# Patient Record
Sex: FEMALE | Race: WHITE | Employment: OTHER | URBAN - METROPOLITAN AREA
[De-identification: names, ages, dates, MRNs, and addresses within clinical notes are randomized per-mention and may not be internally consistent; named-entity substitution may affect disease eponyms.]

---

## 2017-01-23 PROBLEM — M79.2: Status: ACTIVE | Noted: 2017-01-23

## 2017-01-23 PROBLEM — M54.14 THORACIC RADICULOPATHY: Status: ACTIVE | Noted: 2017-01-23

## 2017-01-23 PROBLEM — M79.18 MYOFASCIAL PAIN: Status: ACTIVE | Noted: 2017-01-23

## 2017-04-20 PROBLEM — Z01.810 PREOP CARDIOVASCULAR EXAM: Status: ACTIVE | Noted: 2017-04-20

## 2017-08-31 PROBLEM — Z01.810 PREOP CARDIOVASCULAR EXAM: Status: RESOLVED | Noted: 2017-04-20 | Resolved: 2017-08-31

## 2018-09-11 PROCEDURE — 82785 ASSAY OF IGE: CPT | Performed by: ALLERGY & IMMUNOLOGY

## 2018-09-11 PROCEDURE — 86003 ALLG SPEC IGE CRUDE XTRC EA: CPT | Performed by: ALLERGY & IMMUNOLOGY

## 2018-11-14 PROBLEM — E78.1 HYPERTRIGLYCERIDEMIA: Status: ACTIVE | Noted: 2018-11-14

## 2019-02-27 PROBLEM — E55.9 VITAMIN D DEFICIENCY: Status: ACTIVE | Noted: 2017-11-14

## 2019-02-27 PROBLEM — R63.5 ABNORMAL WEIGHT GAIN: Status: ACTIVE | Noted: 2017-08-25

## 2019-02-27 PROBLEM — R76.8 AUTOANTIBODY TITER ELEVATED: Status: ACTIVE | Noted: 2017-11-14

## 2019-02-27 PROBLEM — N28.9 RENAL INSUFFICIENCY SYNDROME: Status: ACTIVE | Noted: 2017-11-14

## 2019-03-22 PROBLEM — N18.30 CKD (CHRONIC KIDNEY DISEASE) STAGE 3, GFR 30-59 ML/MIN (HCC): Status: ACTIVE | Noted: 2019-03-22

## 2019-04-22 PROCEDURE — 87272 CRYPTOSPORIDIUM AG IF: CPT | Performed by: PHYSICIAN ASSISTANT

## 2019-04-22 PROCEDURE — 87045 FECES CULTURE AEROBIC BACT: CPT | Performed by: PHYSICIAN ASSISTANT

## 2019-04-22 PROCEDURE — 87329 GIARDIA AG IA: CPT | Performed by: PHYSICIAN ASSISTANT

## 2019-04-22 PROCEDURE — 87177 OVA AND PARASITES SMEARS: CPT | Performed by: PHYSICIAN ASSISTANT

## 2019-04-22 PROCEDURE — 87046 STOOL CULTR AEROBIC BACT EA: CPT | Performed by: PHYSICIAN ASSISTANT

## 2019-04-22 PROCEDURE — 87077 CULTURE AEROBIC IDENTIFY: CPT | Performed by: PHYSICIAN ASSISTANT

## 2019-04-22 PROCEDURE — 87209 SMEAR COMPLEX STAIN: CPT | Performed by: PHYSICIAN ASSISTANT

## 2019-04-22 PROCEDURE — 87493 C DIFF AMPLIFIED PROBE: CPT | Performed by: PHYSICIAN ASSISTANT

## 2019-05-21 PROBLEM — E78.1 HYPERTRIGLYCERIDEMIA: Status: RESOLVED | Noted: 2018-11-14 | Resolved: 2019-05-21

## 2019-05-21 PROBLEM — R63.5 ABNORMAL WEIGHT GAIN: Status: RESOLVED | Noted: 2017-08-25 | Resolved: 2019-05-21

## 2019-05-30 PROBLEM — Z15.89 COMPOUND HETEROZYGOUS MTHFR MUTATION C677T/A1298C: Status: ACTIVE | Noted: 2019-05-30

## 2019-05-30 PROCEDURE — 82232 ASSAY OF BETA-2 PROTEIN: CPT | Performed by: INTERNAL MEDICINE

## 2019-05-30 PROCEDURE — 86334 IMMUNOFIX E-PHORESIS SERUM: CPT | Performed by: INTERNAL MEDICINE

## 2019-05-30 PROCEDURE — 82784 ASSAY IGA/IGD/IGG/IGM EACH: CPT | Performed by: INTERNAL MEDICINE

## 2019-05-30 PROCEDURE — 84165 PROTEIN E-PHORESIS SERUM: CPT | Performed by: INTERNAL MEDICINE

## 2019-05-30 PROCEDURE — 83883 ASSAY NEPHELOMETRY NOT SPEC: CPT | Performed by: INTERNAL MEDICINE

## 2020-08-17 ENCOUNTER — TELEPHONE (OUTPATIENT)
Dept: PHYSICAL THERAPY | Age: 65
End: 2020-08-17

## 2020-08-20 ENCOUNTER — APPOINTMENT (OUTPATIENT)
Dept: SPEECH THERAPY | Age: 65
End: 2020-08-20
Attending: OTOLARYNGOLOGY
Payer: COMMERCIAL

## 2020-08-27 ENCOUNTER — TELEMEDICINE (OUTPATIENT)
Dept: SPEECH THERAPY | Age: 65
End: 2020-08-27
Attending: OTOLARYNGOLOGY
Payer: COMMERCIAL

## 2020-08-27 PROCEDURE — 92524 BEHAVRAL QUALIT ANALYS VOICE: CPT

## 2020-08-27 PROCEDURE — 92507 TX SP LANG VOICE COMM INDIV: CPT

## 2020-08-27 NOTE — PROGRESS NOTES
VOCAL CORD DYSFUNCTION EVALUATION:   Referring Physician: Dr. Eduardo Troy  Diagnosis: Vocal Cord Dysfunction J38.3     Date of Service: 8/27/2020     PATIENT SUMMARY   Hamzah Szymanski is a 62year old y/o female  who presents to therapy today via telehea Take 1 tablet (25 mg total) by mouth daily. , Disp: 90 tablet, Rfl: 1  Budesonide-Formoterol Fumarate (SYMBICORT) 160-4.5 MCG/ACT Inhalation Aerosol, Inhale 2 puffs into the lungs 2 (two) times daily. , Disp: 1 Inhaler, Rfl: 0  Spacer/Aero-Holding Farida Factor Estradiol (VAGIFEM) 10 MCG Vaginal Tab, Place vaginally. 2 times weekly , Disp: , Rfl:     No current facility-administered medications on file prior to visit. Manuel Duran describes prior level of function as [facility] prior to June.  Pt goals include wanting was also educated on strategies for preventing/remediating a VCD attack, including exhalation followed by round lip breathing or a sniff-blow breathing. Pt required direct verbal instruction in order to complete this sequence.  Pt was provided with a home e [phone].  If you have any questions, please contact me at Dept: [phone]    Sincerely,  Electronically signed by therapist: NED Gonzalez MS, CCC-SLP/L  Licensed Speech-Language Pathologist      Physician's certification

## 2020-09-01 ENCOUNTER — TELEPHONE (OUTPATIENT)
Dept: PHYSICAL THERAPY | Age: 65
End: 2020-09-01

## 2020-09-03 ENCOUNTER — APPOINTMENT (OUTPATIENT)
Dept: SPEECH THERAPY | Age: 65
End: 2020-09-03
Attending: OTOLARYNGOLOGY
Payer: COMMERCIAL

## 2020-09-17 ENCOUNTER — TELEMEDICINE (OUTPATIENT)
Dept: SPEECH THERAPY | Age: 65
End: 2020-09-17
Attending: OTOLARYNGOLOGY
Payer: COMMERCIAL

## 2020-09-17 PROCEDURE — 92507 TX SP LANG VOICE COMM INDIV: CPT

## 2020-09-17 NOTE — PROGRESS NOTES
Treatment Number: 2  Diagnosis: vocal cord dysfunction J38.3      Precautions: none  Insurance Type (# Auth): BCBS PPO   Date POC Expires: 11/25/2020      Total Treatment time: 60 min  Charges: [id number]    Subjective: Therapy completed over telehealth.  Nazanin Plan: follow up next week over telehealth. Target breathing strategies.

## 2020-09-24 ENCOUNTER — TELEMEDICINE (OUTPATIENT)
Dept: SPEECH THERAPY | Age: 65
End: 2020-09-24
Attending: OTOLARYNGOLOGY
Payer: COMMERCIAL

## 2020-09-24 PROCEDURE — 92507 TX SP LANG VOICE COMM INDIV: CPT

## 2020-09-24 NOTE — PROGRESS NOTES
Treatment Number: 3  Diagnosis: vocal cord dysfunction J38.3      Precautions: none  Insurance Type (# Auth): BCBS PPO   Date POC Expires: 11/25/2020      Total Treatment time: 60 min  Charges: [id number]    Subjective: Therapy completed over telehealth.  Nazanin to take deep breaths. She continues to note occasional gasps for air and we went through how to use strategies to get through that. Encouraged her to keep working on increasing the amount of time she is using the diaphragmatic breathing.      Plan: follow u

## 2020-10-15 ENCOUNTER — APPOINTMENT (OUTPATIENT)
Dept: SPEECH THERAPY | Age: 65
End: 2020-10-15
Attending: OTOLARYNGOLOGY
Payer: COMMERCIAL

## 2020-11-06 ENCOUNTER — HOSPITAL ENCOUNTER (OUTPATIENT)
Dept: CV DIAGNOSTICS | Age: 65
Discharge: HOME | End: 2020-11-06
Attending: INTERNAL MEDICINE
Payer: COMMERCIAL

## 2020-11-06 DIAGNOSIS — R06.02 SOB (SHORTNESS OF BREATH): ICD-10-CM

## 2020-11-06 PROCEDURE — 93306 TTE W/DOPPLER COMPLETE: CPT | Performed by: INTERNAL MEDICINE

## 2021-05-30 ENCOUNTER — LAB ENCOUNTER (OUTPATIENT)
Dept: LAB | Facility: HOSPITAL | Age: 66
End: 2021-05-30
Attending: INTERNAL MEDICINE
Payer: COMMERCIAL

## 2021-05-30 DIAGNOSIS — Z01.818 PRE-OP TESTING: ICD-10-CM

## 2021-06-02 ENCOUNTER — HOSPITAL ENCOUNTER (OUTPATIENT)
Facility: HOSPITAL | Age: 66
Setting detail: HOSPITAL OUTPATIENT SURGERY
Discharge: HOME | End: 2021-06-02
Attending: INTERNAL MEDICINE | Admitting: INTERNAL MEDICINE
Payer: COMMERCIAL

## 2021-06-02 VITALS
OXYGEN SATURATION: 100 % | HEART RATE: 72 BPM | RESPIRATION RATE: 14 BRPM | SYSTOLIC BLOOD PRESSURE: 127 MMHG | DIASTOLIC BLOOD PRESSURE: 81 MMHG

## 2021-06-02 DIAGNOSIS — Z01.818 PRE-OP TESTING: Primary | ICD-10-CM

## 2021-06-02 DIAGNOSIS — R13.19 ESOPHAGEAL DYSPHAGIA: ICD-10-CM

## 2021-06-02 PROCEDURE — 4A0B7BZ MEASUREMENT OF GASTROINTESTINAL PRESSURE, VIA NATURAL OR ARTIFICIAL OPENING: ICD-10-PCS | Performed by: INTERNAL MEDICINE

## 2021-06-02 RX ORDER — LIDOCAINE HYDROCHLORIDE 20 MG/ML
SOLUTION OROPHARYNGEAL
Status: DISCONTINUED | OUTPATIENT
Start: 2021-06-02 | End: 2021-06-02

## 2021-06-30 PROBLEM — M79.2: Status: RESOLVED | Noted: 2017-01-23 | Resolved: 2021-06-30

## 2021-08-02 NOTE — PROCEDURES
ESOPHAGEAL MANOMETRY REPORT    Patient Name:  Marely Godwin  Medical Record #: [id number]  YOB: 1955  Date of Procedure: 6/2/2021    Referring physician: Tanis Riedel, MD    Surgeon:  Janine Olguin MD    Indication: Dysphagia    Findings

## 2021-09-03 PROBLEM — E72.10: Status: ACTIVE | Noted: 2019-05-30

## 2021-09-03 PROBLEM — F32.A DEPRESSIVE DISORDER: Status: ACTIVE | Noted: 2019-12-17

## 2021-09-03 PROBLEM — G43.019 COMMON MIGRAINE WITH INTRACTABLE MIGRAINE: Status: ACTIVE | Noted: 2018-12-05

## 2021-09-03 PROBLEM — Z87.442 HISTORY OF RENAL CALCULI: Status: ACTIVE | Noted: 2019-12-17

## 2021-09-03 PROBLEM — G57.10 MERALGIA PARESTHETICA: Status: ACTIVE | Noted: 2021-04-27

## 2021-09-03 PROBLEM — D18.03 HEMANGIOMA OF LIVER: Status: ACTIVE | Noted: 2019-12-17

## 2021-09-03 PROBLEM — N28.9 RENAL INSUFFICIENCY SYNDROME: Status: ACTIVE | Noted: 2017-11-14

## 2021-09-03 PROBLEM — G43.109 MIGRAINE HEADACHE WITH AURA: Status: ACTIVE | Noted: 2021-09-03

## 2022-01-28 PROBLEM — B34.9 VIRAL SYNDROME: Status: ACTIVE | Noted: 2022-01-28

## 2022-02-01 PROBLEM — R59.0 LYMPHADENOPATHY OF HEAD AND NECK REGION: Status: ACTIVE | Noted: 2022-02-01

## 2022-05-04 ENCOUNTER — PATIENT MESSAGE (OUTPATIENT)
Dept: FAMILY MEDICINE CLINIC | Facility: CLINIC | Age: 67
End: 2022-05-04

## 2022-05-05 NOTE — TELEPHONE ENCOUNTER
From: David Martinez  To: Tobias Harris DO  Sent: 5/4/2022 4:55 PM CDT  Subject: Appointment change    Can the appointment department please check to see if there is an early morning appointment available on 5/11? I need to leave your office no later than 12:30 that day and need to move my 1:45 appointment w/Dr. Joan Carrion.     Thank you,  Nader Hong  [phone]

## 2022-05-11 ENCOUNTER — PATIENT MESSAGE (OUTPATIENT)
Dept: FAMILY MEDICINE CLINIC | Facility: CLINIC | Age: 67
End: 2022-05-11

## 2022-05-12 NOTE — TELEPHONE ENCOUNTER
From: Katina Greenwood  To: Maki Bowser,   Sent: 5/11/2022 3:58 PM CDT  Subject: 5/12 appointment    On 4/19 I began w/terrible pelvic and lower back pain. Urologist ruled out kidney stones (I do have small ones in left kidney). Back surgeon ruled out any back issues w/xray. GNYE/Urologist did external/internal ultrasound and found the left ovary intact, no masses. She called me yesterday and wants to see me Friday the 13th and suspects: Interstitial cystitis. Immediate care put me on a prednisone pack on 4/20 and again on 5/8 with no relief. Also on the 13th I am having a CT w/contrast @ [facility] along with kidney hydration protocol due to Stage III Kidney Disease. Do you think that I should keep my 3 PM appointment on the 12th knowing that all of this back/pelvic pain is going on for 3 weeks now and the pain level is at about a \"7\"? Thanks for you help!!!     Narda Lopez

## 2022-08-16 ENCOUNTER — OFFICE VISIT (OUTPATIENT)
Dept: FAMILY MEDICINE CLINIC | Facility: CLINIC | Age: 67
End: 2022-08-16
Payer: MEDICARE

## 2022-08-16 VITALS
WEIGHT: 201 LBS | TEMPERATURE: 98 F | BODY MASS INDEX: 36.99 KG/M2 | DIASTOLIC BLOOD PRESSURE: 80 MMHG | SYSTOLIC BLOOD PRESSURE: 136 MMHG | HEIGHT: 62 IN

## 2022-08-16 DIAGNOSIS — F41.9 ANXIETY: ICD-10-CM

## 2022-08-16 DIAGNOSIS — M79.7 FIBROMYALGIA: ICD-10-CM

## 2022-08-16 DIAGNOSIS — M54.50 BILATERAL LOW BACK PAIN WITHOUT SCIATICA, UNSPECIFIED CHRONICITY: ICD-10-CM

## 2022-08-16 DIAGNOSIS — G44.219 FREQUENT EPISODIC TENSION-TYPE HEADACHE: Primary | ICD-10-CM

## 2022-08-16 DIAGNOSIS — M54.2 NECK PAIN: ICD-10-CM

## 2022-08-16 PROCEDURE — 98929 OSTEOPATH MANJ 9-10 REGIONS: CPT | Performed by: FAMILY MEDICINE

## 2022-08-16 PROCEDURE — 99214 OFFICE O/P EST MOD 30 MIN: CPT | Performed by: FAMILY MEDICINE

## 2023-03-14 ENCOUNTER — TELEPHONE (OUTPATIENT)
Dept: NEUROLOGY | Facility: CLINIC | Age: 68
End: 2023-03-14

## 2023-03-14 ENCOUNTER — APPOINTMENT (OUTPATIENT)
Dept: MRI IMAGING | Age: 68
End: 2023-03-14
Attending: EMERGENCY MEDICINE
Payer: MEDICARE

## 2023-03-14 ENCOUNTER — APPOINTMENT (OUTPATIENT)
Dept: GENERAL RADIOLOGY | Age: 68
End: 2023-03-14
Attending: EMERGENCY MEDICINE
Payer: MEDICARE

## 2023-03-14 ENCOUNTER — HOSPITAL ENCOUNTER (EMERGENCY)
Age: 68
Discharge: HOME | End: 2023-03-14
Attending: EMERGENCY MEDICINE
Payer: MEDICARE

## 2023-03-14 VITALS
OXYGEN SATURATION: 96 % | WEIGHT: 198 LBS | RESPIRATION RATE: 18 BRPM | BODY MASS INDEX: 37.38 KG/M2 | DIASTOLIC BLOOD PRESSURE: 68 MMHG | HEART RATE: 78 BPM | SYSTOLIC BLOOD PRESSURE: 122 MMHG | HEIGHT: 61 IN

## 2023-03-14 DIAGNOSIS — R42 DIZZINESS: Primary | ICD-10-CM

## 2023-03-14 DIAGNOSIS — R68.84 JAW PAIN: ICD-10-CM

## 2023-03-14 DIAGNOSIS — I74.9 TIA DUE TO EMBOLISM (HCC): ICD-10-CM

## 2023-03-14 DIAGNOSIS — G45.9 TIA DUE TO EMBOLISM (HCC): ICD-10-CM

## 2023-03-14 DIAGNOSIS — G45.9 BRAIN TIA: ICD-10-CM

## 2023-03-14 LAB
ALBUMIN SERPL-MCNC: 4 G/DL (ref 3.4–5)
ALBUMIN/GLOB SERPL: 1.1 {RATIO} (ref 1–2)
ALP LIVER SERPL-CCNC: 85 U/L
ALT SERPL-CCNC: 21 U/L
ANION GAP SERPL CALC-SCNC: 6 MMOL/L (ref 0–18)
AST SERPL-CCNC: 15 U/L (ref 15–37)
ATRIAL RATE: 87 BPM
BASOPHILS # BLD AUTO: 0.18 X10(3) UL (ref 0–0.2)
BASOPHILS NFR BLD AUTO: 2.2 %
BILIRUB SERPL-MCNC: 0.5 MG/DL (ref 0.1–2)
BUN BLD-MCNC: 13 MG/DL (ref 7–18)
CALCIUM BLD-MCNC: 9.2 MG/DL (ref 8.5–10.1)
CHLORIDE SERPL-SCNC: 107 MMOL/L (ref 98–112)
CO2 SERPL-SCNC: 25 MMOL/L (ref 21–32)
CREAT BLD-MCNC: 1.25 MG/DL
D DIMER PPP FEU-MCNC: 0.28 UG/ML FEU (ref ?–0.66)
EOSINOPHIL # BLD AUTO: 0.3 X10(3) UL (ref 0–0.7)
EOSINOPHIL NFR BLD AUTO: 3.6 %
ERYTHROCYTE [DISTWIDTH] IN BLOOD BY AUTOMATED COUNT: 15.2 %
GFR SERPLBLD BASED ON 1.73 SQ M-ARVRAT: 48 ML/MIN/1.73M2 (ref 60–?)
GLOBULIN PLAS-MCNC: 3.5 G/DL (ref 2.8–4.4)
GLUCOSE BLD-MCNC: 118 MG/DL (ref 70–99)
GLUCOSE BLD-MCNC: 132 MG/DL (ref 70–99)
HCT VFR BLD AUTO: 39.3 %
HGB BLD-MCNC: 12.6 G/DL
IMM GRANULOCYTES # BLD AUTO: 0.05 X10(3) UL (ref 0–1)
IMM GRANULOCYTES NFR BLD: 0.6 %
LYMPHOCYTES # BLD AUTO: 1.9 X10(3) UL (ref 1–4)
LYMPHOCYTES NFR BLD AUTO: 23 %
MCH RBC QN AUTO: 25 PG (ref 26–34)
MCHC RBC AUTO-ENTMCNC: 32.1 G/DL (ref 31–37)
MCV RBC AUTO: 78 FL
MONOCYTES # BLD AUTO: 0.64 X10(3) UL (ref 0.1–1)
MONOCYTES NFR BLD AUTO: 7.7 %
NEUTROPHILS # BLD AUTO: 5.2 X10 (3) UL (ref 1.5–7.7)
NEUTROPHILS # BLD AUTO: 5.2 X10(3) UL (ref 1.5–7.7)
NEUTROPHILS NFR BLD AUTO: 62.9 %
NT-PROBNP SERPL-MCNC: 21 PG/ML (ref ?–125)
OSMOLALITY SERPL CALC.SUM OF ELEC: 288 MOSM/KG (ref 275–295)
P AXIS: 10 DEGREES
P-R INTERVAL: 138 MS
PLATELET # BLD AUTO: 483 10(3)UL (ref 150–450)
POTASSIUM SERPL-SCNC: 4.3 MMOL/L (ref 3.5–5.1)
PROT SERPL-MCNC: 7.5 G/DL (ref 6.4–8.2)
Q-T INTERVAL: 430 MS
QRS DURATION: 68 MS
QTC CALCULATION (BEZET): 517 MS
R AXIS: 40 DEGREES
RBC # BLD AUTO: 5.04 X10(6)UL
SODIUM SERPL-SCNC: 138 MMOL/L (ref 136–145)
T AXIS: 15 DEGREES
TROPONIN I HIGH SENSITIVITY: 8 NG/L
TROPONIN I HIGH SENSITIVITY: 9 NG/L
VENTRICULAR RATE: 87 BPM
WBC # BLD AUTO: 8.3 X10(3) UL (ref 4–11)

## 2023-03-14 PROCEDURE — 70551 MRI BRAIN STEM W/O DYE: CPT | Performed by: EMERGENCY MEDICINE

## 2023-03-14 PROCEDURE — 85379 FIBRIN DEGRADATION QUANT: CPT | Performed by: EMERGENCY MEDICINE

## 2023-03-14 PROCEDURE — 84484 ASSAY OF TROPONIN QUANT: CPT | Performed by: EMERGENCY MEDICINE

## 2023-03-14 PROCEDURE — 85025 COMPLETE CBC W/AUTO DIFF WBC: CPT | Performed by: EMERGENCY MEDICINE

## 2023-03-14 PROCEDURE — 83880 ASSAY OF NATRIURETIC PEPTIDE: CPT | Performed by: EMERGENCY MEDICINE

## 2023-03-14 PROCEDURE — 80053 COMPREHEN METABOLIC PANEL: CPT | Performed by: EMERGENCY MEDICINE

## 2023-03-14 PROCEDURE — 82962 GLUCOSE BLOOD TEST: CPT

## 2023-03-14 PROCEDURE — 99285 EMERGENCY DEPT VISIT HI MDM: CPT

## 2023-03-14 PROCEDURE — 36415 COLL VENOUS BLD VENIPUNCTURE: CPT

## 2023-03-14 PROCEDURE — 71045 X-RAY EXAM CHEST 1 VIEW: CPT | Performed by: EMERGENCY MEDICINE

## 2023-03-14 PROCEDURE — 93010 ELECTROCARDIOGRAM REPORT: CPT

## 2023-03-14 PROCEDURE — 70544 MR ANGIOGRAPHY HEAD W/O DYE: CPT | Performed by: EMERGENCY MEDICINE

## 2023-03-14 PROCEDURE — 93005 ELECTROCARDIOGRAM TRACING: CPT

## 2023-03-14 RX ORDER — ASPIRIN 81 MG/1
324 TABLET, CHEWABLE ORAL ONCE
Status: COMPLETED | OUTPATIENT
Start: 2023-03-14 | End: 2023-03-14

## 2023-03-14 RX ORDER — MECLIZINE HYDROCHLORIDE 25 MG/1
25 TABLET ORAL ONCE
Status: COMPLETED | OUTPATIENT
Start: 2023-03-14 | End: 2023-03-14

## 2023-03-14 RX ORDER — MECLIZINE HCL 25MG 25 MG/1
25 TABLET, CHEWABLE ORAL 3 TIMES DAILY PRN
Qty: 90 TABLET | Refills: 0 | Status: SHIPPED | OUTPATIENT
Start: 2023-03-14 | End: 2023-03-14

## 2023-03-14 RX ORDER — MECLIZINE HCL 25MG 25 MG/1
25 TABLET, CHEWABLE ORAL 3 TIMES DAILY PRN
Qty: 90 TABLET | Refills: 0 | Status: SHIPPED | OUTPATIENT
Start: 2023-03-14 | End: 2023-03-29

## 2023-03-14 NOTE — DISCHARGE INSTRUCTIONS
Take  4 baby aspirin daily. Follow-up with neurology, TIA clinic, cardiology return if any severe chest pain, shortness of breath, dizziness, numbness, weakness, trouble speaking. You were seen in the emergency room in a limited time. There is a possibility that although we do not see any acute process at this present time that things can change with time. Is therefore imperative that you follow-up with primary care physician for close follow-up. If there is any significant progression of your pain  or other symptoms you to return immediately to the emergency room.

## 2023-03-14 NOTE — ED INITIAL ASSESSMENT (HPI)
Pt woke up at 0430 with dizziness and right sided jaw pain. Pt called friend who stated her speech was slurred.

## 2023-03-14 NOTE — TELEPHONE ENCOUNTER
TIA CLINIC SCREENING    1. Date of ED visit/TIA diagnosis:  3/14/2023   Time of discharge from ED:  1416    2. Is patient currently admitted? No   If YES - TIA Clinic Appointment not required. 3. Does patient already see an [facility] neurologist?  No  Name:  But patient has [facility] Neurologist Trini Acharya)   (if YES - TIA Clinic Appointment not required. Route message on to patient's neurologist)    4. Patient's current anti-platelet therapy:  917PA ASA    5. Patient's current statin therapy:  Rosuvastatin    6. Has 2D Echo with bubble test been done? No  Date:      7. Is TIA Clinic Appointment indicated? Yes     If YES - route encounter to [address] [place] [address] to schedule patient for appointment NO LATER THAN 48 HOURS AFTER ED DISCHARGE. If UNSURE - route encounter to clinic provider for recommendation.      If NO - indicate reason and close encounter:  N/A

## 2023-03-16 ENCOUNTER — TELEPHONE (OUTPATIENT)
Dept: NEUROLOGY | Facility: CLINIC | Age: 68
End: 2023-03-16

## 2023-03-16 ENCOUNTER — OFFICE VISIT (OUTPATIENT)
Dept: NEUROLOGY | Facility: CLINIC | Age: 68
End: 2023-03-16
Payer: MEDICARE

## 2023-03-16 VITALS
BODY MASS INDEX: 38 KG/M2 | DIASTOLIC BLOOD PRESSURE: 70 MMHG | SYSTOLIC BLOOD PRESSURE: 118 MMHG | RESPIRATION RATE: 16 BRPM | HEART RATE: 78 BPM | WEIGHT: 204 LBS

## 2023-03-16 DIAGNOSIS — R93.0 ABNORMAL MRA, HEAD: ICD-10-CM

## 2023-03-16 DIAGNOSIS — R20.2 PARESTHESIA OF LEFT UPPER AND LOWER EXTREMITY: ICD-10-CM

## 2023-03-16 DIAGNOSIS — R47.89 EPISODE OF CHANGE IN SPEECH: Primary | ICD-10-CM

## 2023-03-16 PROCEDURE — 99204 OFFICE O/P NEW MOD 45 MIN: CPT | Performed by: OTHER

## 2023-03-16 RX ORDER — BUTALBITAL, ACETAMINOPHEN AND CAFFEINE 50; 325; 40 MG/1; MG/1; MG/1
TABLET ORAL
COMMUNITY
Start: 2023-02-25

## 2023-03-16 RX ORDER — ALBUTEROL SULFATE 2.5 MG/3ML
2.5 SOLUTION RESPIRATORY (INHALATION) EVERY 4 HOURS PRN
COMMUNITY
Start: 2023-01-29

## 2023-03-16 RX ORDER — ALBUTEROL SULFATE 90 UG/1
2 AEROSOL, METERED RESPIRATORY (INHALATION) EVERY 4 HOURS PRN
COMMUNITY
Start: 2023-01-27

## 2023-03-16 RX ORDER — BUDESONIDE 0.5 MG/2ML
INHALANT ORAL
COMMUNITY
Start: 2023-02-03

## 2023-03-16 RX ORDER — DILTIAZEM HYDROCHLORIDE 120 MG/1
240 CAPSULE, EXTENDED RELEASE ORAL DAILY
COMMUNITY
Start: 2023-03-10

## 2023-03-16 RX ORDER — TEMAZEPAM 30 MG/1
30 CAPSULE ORAL NIGHTLY
COMMUNITY
Start: 2023-02-24

## 2023-03-16 RX ORDER — SPIRONOLACTONE 50 MG/1
50 TABLET, FILM COATED ORAL DAILY
Qty: 30 TABLET | Refills: 12 | COMMUNITY
Start: 2023-03-01 | End: 2024-03-25

## 2023-03-16 NOTE — PROGRESS NOTES
Patient was seen in the ED on 03/14/2023. Patient had dizziness, numbness in the LLE and LUE. Patient also had slurred speech at this time. Patient states shooting pain on the jaw which lasted a few seconds. This occurred 4 times. Patient states she is well, denies numbness and tingling. Denies changes in speech. Patient states increased fatigue. Patient states increase in intensity and frequency of migraines before the episode on 03/14/2023. Patient states increase occurred since 01/2023.

## 2023-03-17 ENCOUNTER — TELEPHONE (OUTPATIENT)
Dept: SURGERY | Facility: CLINIC | Age: 68
End: 2023-03-17

## 2023-03-17 NOTE — TELEPHONE ENCOUNTER
GLORIA for MA/Nurse at Dr. Joanne Beck office regarding the hydration protocol. Requested call back with any questions. Left message for patient advising above has been done.

## 2023-03-17 NOTE — TELEPHONE ENCOUNTER
Pt is having Kidney Hydration Protocol on Thursday, March 23. She needs an RN to call Dr Mor Dow at [phone]. He need to call it in to [phone]. Pt's best call back number for questions is [phone].

## 2023-03-20 NOTE — TELEPHONE ENCOUNTER
Pt calling requesting to speak to a nurse regarding kidney hydration protocol paperwork that needs to be sent to central scheduling.

## 2023-03-20 NOTE — PROGRESS NOTES
Adding hydration orders to imaging order. Orders received via fax from [address] [place] [address]. RN placed orders and called Central Scheduling to update on status as now two orders appear in system.      RN spoke with Cheryl and she updated orders

## 2023-03-20 NOTE — TELEPHONE ENCOUNTER
Orders received stated 500 ml of 0.9% normal saline over 1 hour 2 hours prior to CT scan. Uploaded to CT order     Copy sent to scanning.

## 2023-03-20 NOTE — TELEPHONE ENCOUNTER
Informed pt that two messages have been left for Dr. Yuan Dumont office for hydration protocol. Gave pt fax number to have protocol faxed to office.

## 2023-03-22 RX ORDER — SODIUM CHLORIDE 9 MG/ML
500 INJECTION, SOLUTION INTRAVENOUS CONTINUOUS
Status: ACTIVE | OUTPATIENT
Start: 2023-03-29 | End: 2023-03-29

## 2023-03-24 ENCOUNTER — PATIENT MESSAGE (OUTPATIENT)
Dept: NEUROLOGY | Facility: CLINIC | Age: 68
End: 2023-03-24

## 2023-03-24 ENCOUNTER — TELEPHONE (OUTPATIENT)
Dept: NEUROLOGY | Facility: CLINIC | Age: 68
End: 2023-03-24

## 2023-03-24 NOTE — TELEPHONE ENCOUNTER
Lab results reviewed and signed by Dr Marilee Cheema and sent to scanning. See alternate TE 3/24/23.

## 2023-03-24 NOTE — TELEPHONE ENCOUNTER
From: Mary Malhotra  To: Dann Finn DO  Sent: 3/24/2023 8:58 AM CDT  Subject: 3/23/23 [facility] results    Please see attached, and thank you. Notice the GFR results dropped.  :(

## 2023-03-27 ENCOUNTER — OFFICE VISIT (OUTPATIENT)
Dept: FAMILY MEDICINE CLINIC | Facility: CLINIC | Age: 68
End: 2023-03-27
Payer: MEDICARE

## 2023-03-27 VITALS
HEART RATE: 90 BPM | WEIGHT: 205 LBS | BODY MASS INDEX: 38 KG/M2 | SYSTOLIC BLOOD PRESSURE: 150 MMHG | TEMPERATURE: 97 F | OXYGEN SATURATION: 98 % | DIASTOLIC BLOOD PRESSURE: 80 MMHG

## 2023-03-27 DIAGNOSIS — M99.01 CERVICAL SEGMENT DYSFUNCTION: ICD-10-CM

## 2023-03-27 DIAGNOSIS — F43.29 STRESS AND ADJUSTMENT REACTION: ICD-10-CM

## 2023-03-27 DIAGNOSIS — M54.2 NECK PAIN: ICD-10-CM

## 2023-03-27 DIAGNOSIS — M54.50 BILATERAL LOW BACK PAIN WITHOUT SCIATICA, UNSPECIFIED CHRONICITY: Primary | ICD-10-CM

## 2023-03-27 DIAGNOSIS — M99.05 SOMATIC DYSFUNCTION OF PELVIS REGION: ICD-10-CM

## 2023-03-27 DIAGNOSIS — G44.219 FREQUENT EPISODIC TENSION-TYPE HEADACHE: ICD-10-CM

## 2023-03-27 DIAGNOSIS — M99.00 HEAD REGION SOMATIC DYSFUNCTION: ICD-10-CM

## 2023-03-27 DIAGNOSIS — M99.08 RIB CAGE REGION SOMATIC DYSFUNCTION: ICD-10-CM

## 2023-03-29 ENCOUNTER — HOSPITAL ENCOUNTER (OUTPATIENT)
Dept: CT IMAGING | Facility: HOSPITAL | Age: 68
Discharge: HOME | End: 2023-03-29
Attending: Other
Payer: MEDICARE

## 2023-03-29 ENCOUNTER — NURSE ONLY (OUTPATIENT)
Dept: LAB | Facility: HOSPITAL | Age: 68
End: 2023-03-29
Attending: Other
Payer: MEDICARE

## 2023-03-29 VITALS
OXYGEN SATURATION: 99 % | SYSTOLIC BLOOD PRESSURE: 142 MMHG | RESPIRATION RATE: 22 BRPM | HEART RATE: 82 BPM | DIASTOLIC BLOOD PRESSURE: 70 MMHG

## 2023-03-29 DIAGNOSIS — R47.89 EPISODE OF CHANGE IN SPEECH: ICD-10-CM

## 2023-03-29 DIAGNOSIS — R93.0 ABNORMAL MRA, HEAD: ICD-10-CM

## 2023-03-29 DIAGNOSIS — R20.2 PARESTHESIA OF LEFT UPPER AND LOWER EXTREMITY: ICD-10-CM

## 2023-03-29 PROCEDURE — 70496 CT ANGIOGRAPHY HEAD: CPT | Performed by: OTHER

## 2023-03-29 PROCEDURE — 70498 CT ANGIOGRAPHY NECK: CPT | Performed by: OTHER

## 2023-03-29 PROCEDURE — 96360 HYDRATION IV INFUSION INIT: CPT

## 2023-03-29 RX ORDER — SODIUM CHLORIDE 9 MG/ML
INJECTION, SOLUTION INTRAVENOUS CONTINUOUS
Status: DISCONTINUED | OUTPATIENT
Start: 2023-03-29 | End: 2023-03-29

## 2023-03-29 RX ADMIN — SODIUM CHLORIDE: 9 INJECTION, SOLUTION INTRAVENOUS at 09:30:00

## 2023-04-01 ENCOUNTER — PATIENT MESSAGE (OUTPATIENT)
Dept: NEUROLOGY | Facility: CLINIC | Age: 68
End: 2023-04-01

## 2023-04-03 NOTE — TELEPHONE ENCOUNTER
From: Corby Rhodes  To: Lucie Daniels DO  Sent: 4/1/2023 7:55 AM CDT  Subject: Scan result question    Morning Dr. Jono Estrada:    Regarding the verbiage from the report: \"extremely small terminal right vertebral artery\" - Is this something to be corrected, watched, or just we aware of? Would this extremely small artery be the cause of my frequent migraines? Is there any solution to this and what future effects should I expect from this?     Thanks so much,   [facility]

## 2023-06-30 ENCOUNTER — TELEPHONE (OUTPATIENT)
Dept: NEUROLOGY | Facility: CLINIC | Age: 68
End: 2023-06-30

## 2023-06-30 NOTE — TELEPHONE ENCOUNTER
Lvm to confirm appt with Ramakrishna in [place] on 7/3/20 please confirm appt and advise date,time,and location

## 2023-07-03 ENCOUNTER — LAB ENCOUNTER (OUTPATIENT)
Dept: LAB | Facility: HOSPITAL | Age: 68
End: 2023-07-03
Attending: Other
Payer: MEDICARE

## 2023-07-03 ENCOUNTER — OFFICE VISIT (OUTPATIENT)
Dept: NEUROLOGY | Facility: CLINIC | Age: 68
End: 2023-07-03
Payer: MEDICARE

## 2023-07-03 VITALS
DIASTOLIC BLOOD PRESSURE: 70 MMHG | RESPIRATION RATE: 16 BRPM | BODY MASS INDEX: 38 KG/M2 | WEIGHT: 202 LBS | SYSTOLIC BLOOD PRESSURE: 122 MMHG | OXYGEN SATURATION: 97 % | HEART RATE: 77 BPM

## 2023-07-03 DIAGNOSIS — G57.13 MERALGIA PARESTHETICA, BILATERAL LOWER LIMBS: ICD-10-CM

## 2023-07-03 DIAGNOSIS — I20.1 PRINZMETAL ANGINA (HCC): ICD-10-CM

## 2023-07-03 DIAGNOSIS — R47.89 EPISODE OF CHANGE IN SPEECH: Primary | ICD-10-CM

## 2023-07-03 DIAGNOSIS — G43.109 MIGRAINE WITH AURA AND WITHOUT STATUS MIGRAINOSUS, NOT INTRACTABLE: ICD-10-CM

## 2023-07-03 DIAGNOSIS — R47.89 WORD FINDING DIFFICULTY: ICD-10-CM

## 2023-07-03 LAB
TSI SER-ACNC: 1.84 MIU/ML (ref 0.36–3.74)
VIT B12 SERPL-MCNC: 487 PG/ML (ref 193–986)

## 2023-07-03 PROCEDURE — 99214 OFFICE O/P EST MOD 30 MIN: CPT | Performed by: OTHER

## 2023-07-03 PROCEDURE — 82607 VITAMIN B-12: CPT

## 2023-07-03 PROCEDURE — 36415 COLL VENOUS BLD VENIPUNCTURE: CPT

## 2023-07-03 PROCEDURE — 84443 ASSAY THYROID STIM HORMONE: CPT

## 2023-07-03 RX ORDER — VERAPAMIL HYDROCHLORIDE 240 MG/1
240 TABLET, FILM COATED, EXTENDED RELEASE ORAL NIGHTLY
COMMUNITY

## 2023-07-03 RX ORDER — BUSPIRONE HYDROCHLORIDE 10 MG/1
10 TABLET ORAL 2 TIMES DAILY
COMMUNITY
Start: 2023-03-01

## 2023-07-03 RX ORDER — LOSARTAN POTASSIUM 25 MG/1
TABLET ORAL
COMMUNITY
Start: 2023-05-01

## 2023-07-03 RX ORDER — DILTIAZEM HYDROCHLORIDE 120 MG/1
CAPSULE, EXTENDED RELEASE ORAL
COMMUNITY
Start: 2023-03-16

## 2023-07-03 RX ORDER — LORAZEPAM 0.5 MG/1
TABLET ORAL
COMMUNITY
Start: 2023-04-06

## 2023-07-15 ENCOUNTER — APPOINTMENT (OUTPATIENT)
Dept: GENERAL RADIOLOGY | Age: 68
End: 2023-07-15
Attending: EMERGENCY MEDICINE
Payer: MEDICARE

## 2023-07-15 ENCOUNTER — HOSPITAL ENCOUNTER (EMERGENCY)
Age: 68
Discharge: HOME | End: 2023-07-15
Attending: EMERGENCY MEDICINE
Payer: MEDICARE

## 2023-07-15 VITALS
HEART RATE: 64 BPM | BODY MASS INDEX: 36 KG/M2 | OXYGEN SATURATION: 99 % | DIASTOLIC BLOOD PRESSURE: 60 MMHG | SYSTOLIC BLOOD PRESSURE: 123 MMHG | RESPIRATION RATE: 16 BRPM | WEIGHT: 196 LBS | TEMPERATURE: 97 F

## 2023-07-15 DIAGNOSIS — D50.9 IRON DEFICIENCY ANEMIA, UNSPECIFIED IRON DEFICIENCY ANEMIA TYPE: Primary | ICD-10-CM

## 2023-07-15 LAB
ALBUMIN SERPL-MCNC: 3.8 G/DL (ref 3.4–5)
ALBUMIN/GLOB SERPL: 1 {RATIO} (ref 1–2)
ALP LIVER SERPL-CCNC: 83 U/L
ALT SERPL-CCNC: 20 U/L
ANION GAP SERPL CALC-SCNC: 3 MMOL/L (ref 0–18)
AST SERPL-CCNC: 14 U/L (ref 15–37)
ATRIAL RATE: 65 BPM
BASOPHILS # BLD AUTO: 0.13 X10(3) UL (ref 0–0.2)
BASOPHILS NFR BLD AUTO: 1.4 %
BILIRUB SERPL-MCNC: 0.3 MG/DL (ref 0.1–2)
BUN BLD-MCNC: 18 MG/DL (ref 7–18)
CALCIUM BLD-MCNC: 9.2 MG/DL (ref 8.5–10.1)
CHLORIDE SERPL-SCNC: 107 MMOL/L (ref 98–112)
CO2 SERPL-SCNC: 26 MMOL/L (ref 21–32)
CREAT BLD-MCNC: 1.39 MG/DL
D DIMER PPP FEU-MCNC: 0.34 UG/ML FEU (ref ?–0.66)
EOSINOPHIL # BLD AUTO: 0.33 X10(3) UL (ref 0–0.7)
EOSINOPHIL NFR BLD AUTO: 3.5 %
ERYTHROCYTE [DISTWIDTH] IN BLOOD BY AUTOMATED COUNT: 15.8 %
GFR SERPLBLD BASED ON 1.73 SQ M-ARVRAT: 42 ML/MIN/1.73M2 (ref 60–?)
GLOBULIN PLAS-MCNC: 3.7 G/DL (ref 2.8–4.4)
GLUCOSE BLD-MCNC: 76 MG/DL (ref 70–99)
HCT VFR BLD AUTO: 38.3 %
HGB BLD-MCNC: 11.8 G/DL
IMM GRANULOCYTES # BLD AUTO: 0.05 X10(3) UL (ref 0–1)
IMM GRANULOCYTES NFR BLD: 0.5 %
LYMPHOCYTES # BLD AUTO: 2.08 X10(3) UL (ref 1–4)
LYMPHOCYTES NFR BLD AUTO: 22.2 %
MCH RBC QN AUTO: 23.9 PG (ref 26–34)
MCHC RBC AUTO-ENTMCNC: 30.8 G/DL (ref 31–37)
MCV RBC AUTO: 77.5 FL
MONOCYTES # BLD AUTO: 0.84 X10(3) UL (ref 0.1–1)
MONOCYTES NFR BLD AUTO: 9 %
NEUTROPHILS # BLD AUTO: 5.92 X10 (3) UL (ref 1.5–7.7)
NEUTROPHILS # BLD AUTO: 5.92 X10(3) UL (ref 1.5–7.7)
NEUTROPHILS NFR BLD AUTO: 63.4 %
NT-PROBNP SERPL-MCNC: 48 PG/ML (ref ?–125)
OSMOLALITY SERPL CALC.SUM OF ELEC: 283 MOSM/KG (ref 275–295)
P AXIS: 7 DEGREES
P-R INTERVAL: 142 MS
PLATELET # BLD AUTO: 411 10(3)UL (ref 150–450)
POTASSIUM SERPL-SCNC: 3.9 MMOL/L (ref 3.5–5.1)
PROT SERPL-MCNC: 7.5 G/DL (ref 6.4–8.2)
Q-T INTERVAL: 440 MS
QRS DURATION: 72 MS
QTC CALCULATION (BEZET): 457 MS
R AXIS: 23 DEGREES
RBC # BLD AUTO: 4.94 X10(6)UL
SODIUM SERPL-SCNC: 136 MMOL/L (ref 136–145)
T AXIS: 7 DEGREES
TROPONIN I HIGH SENSITIVITY: 6 NG/L
VENTRICULAR RATE: 65 BPM
WBC # BLD AUTO: 9.4 X10(3) UL (ref 4–11)

## 2023-07-15 PROCEDURE — 36415 COLL VENOUS BLD VENIPUNCTURE: CPT

## 2023-07-15 PROCEDURE — 84484 ASSAY OF TROPONIN QUANT: CPT | Performed by: EMERGENCY MEDICINE

## 2023-07-15 PROCEDURE — 93010 ELECTROCARDIOGRAM REPORT: CPT

## 2023-07-15 PROCEDURE — 85025 COMPLETE CBC W/AUTO DIFF WBC: CPT | Performed by: EMERGENCY MEDICINE

## 2023-07-15 PROCEDURE — 99284 EMERGENCY DEPT VISIT MOD MDM: CPT

## 2023-07-15 PROCEDURE — 99285 EMERGENCY DEPT VISIT HI MDM: CPT

## 2023-07-15 PROCEDURE — 85379 FIBRIN DEGRADATION QUANT: CPT | Performed by: EMERGENCY MEDICINE

## 2023-07-15 PROCEDURE — 82272 OCCULT BLD FECES 1-3 TESTS: CPT

## 2023-07-15 PROCEDURE — 80053 COMPREHEN METABOLIC PANEL: CPT | Performed by: EMERGENCY MEDICINE

## 2023-07-15 PROCEDURE — 83880 ASSAY OF NATRIURETIC PEPTIDE: CPT | Performed by: EMERGENCY MEDICINE

## 2023-07-15 PROCEDURE — 71045 X-RAY EXAM CHEST 1 VIEW: CPT | Performed by: EMERGENCY MEDICINE

## 2023-07-15 PROCEDURE — 93005 ELECTROCARDIOGRAM TRACING: CPT

## 2023-07-15 RX ORDER — FERROUS SULFATE 325(65) MG
325 TABLET ORAL
Qty: 30 TABLET | Refills: 0 | Status: SHIPPED | OUTPATIENT
Start: 2023-07-15 | End: 2023-08-14

## 2023-07-15 NOTE — ED INITIAL ASSESSMENT (HPI)
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  States she has chest tightness associated with the condition and a lower heart rate at night

## 2023-07-17 ENCOUNTER — TELEPHONE (OUTPATIENT)
Dept: HEMATOLOGY/ONCOLOGY | Facility: HOSPITAL | Age: 68
End: 2023-07-17

## 2023-07-17 ENCOUNTER — OFFICE VISIT (OUTPATIENT)
Dept: HEMATOLOGY/ONCOLOGY | Facility: HOSPITAL | Age: 68
End: 2023-07-17
Attending: INTERNAL MEDICINE
Payer: MEDICARE

## 2023-07-17 VITALS
WEIGHT: 199.19 LBS | BODY MASS INDEX: 37.61 KG/M2 | RESPIRATION RATE: 16 BRPM | HEIGHT: 61.02 IN | SYSTOLIC BLOOD PRESSURE: 147 MMHG | OXYGEN SATURATION: 98 % | HEART RATE: 78 BPM | DIASTOLIC BLOOD PRESSURE: 80 MMHG | TEMPERATURE: 97 F

## 2023-07-17 DIAGNOSIS — D50.9 IRON DEFICIENCY ANEMIA, UNSPECIFIED IRON DEFICIENCY ANEMIA TYPE: Primary | ICD-10-CM

## 2023-07-17 PROBLEM — D50.8 IRON DEFICIENCY ANEMIA SECONDARY TO INADEQUATE DIETARY IRON INTAKE: Status: ACTIVE | Noted: 2023-07-17

## 2023-07-17 PROCEDURE — 99205 OFFICE O/P NEW HI 60 MIN: CPT | Performed by: INTERNAL MEDICINE

## 2023-07-17 NOTE — TELEPHONE ENCOUNTER
[facility] to see if patient would like to see Dr. Love Alcaraz on 7/17/23 at 100 PM, Called 7/17/23.

## 2023-07-17 NOTE — PROGRESS NOTES
Education Record    Learner:  Patient    Disease / Diagnosis: Iron deficiency    Barriers / Limitations:  None   Comments:    Method:  Discussion   Comments:    General Topics:  Plan of care reviewed   Comments:    Outcome:  Shows understanding   Comments:    Here f/u after [place] ER visit. Here for iron deficiency anemia. She is having a procedure Monday 7/24 and then major surgery 8/12 or 8/15. She feels fatigued, dizzy/lightheaded. No abnormal bleeding. Shortness of breath is present but possibly due to decreasing lasix dose.

## 2023-07-18 ENCOUNTER — APPOINTMENT (OUTPATIENT)
Dept: HEMATOLOGY/ONCOLOGY | Facility: HOSPITAL | Age: 68
End: 2023-07-18
Attending: INTERNAL MEDICINE
Payer: MEDICARE

## 2023-07-18 ENCOUNTER — OFFICE VISIT (OUTPATIENT)
Dept: HEMATOLOGY/ONCOLOGY | Age: 68
End: 2023-07-18
Attending: INTERNAL MEDICINE
Payer: MEDICARE

## 2023-07-18 VITALS
RESPIRATION RATE: 20 BRPM | OXYGEN SATURATION: 98 % | SYSTOLIC BLOOD PRESSURE: 118 MMHG | HEART RATE: 68 BPM | TEMPERATURE: 97 F | DIASTOLIC BLOOD PRESSURE: 72 MMHG

## 2023-07-18 DIAGNOSIS — D50.8 IRON DEFICIENCY ANEMIA SECONDARY TO INADEQUATE DIETARY IRON INTAKE: Primary | ICD-10-CM

## 2023-07-18 PROCEDURE — 96374 THER/PROPH/DIAG INJ IV PUSH: CPT

## 2023-07-18 PROCEDURE — 96375 TX/PRO/DX INJ NEW DRUG ADDON: CPT

## 2023-07-18 PROCEDURE — 96365 THER/PROPH/DIAG IV INF INIT: CPT

## 2023-07-18 RX ORDER — METHYLPREDNISOLONE SODIUM SUCCINATE 125 MG/2ML
125 INJECTION, POWDER, LYOPHILIZED, FOR SOLUTION INTRAMUSCULAR; INTRAVENOUS ONCE
Status: COMPLETED | OUTPATIENT
Start: 2023-07-18 | End: 2023-07-18

## 2023-07-18 RX ADMIN — METHYLPREDNISOLONE SODIUM SUCCINATE 125 MG: 125 INJECTION, POWDER, LYOPHILIZED, FOR SOLUTION INTRAMUSCULAR; INTRAVENOUS at 14:54:00

## 2023-07-18 NOTE — PROGRESS NOTES
Education Record    Learner:  Patient    Disease / Diagnosis: here for feraheme    Barriers / Limitations:  None   Comments:    Method:  Brief focused and Discussion   Comments:    General Topics:  Medication, Side effects and symptom management, and Plan of care reviewed   Comments:    Outcome:  Shows understanding   Comments: Towards the end of Feraheme infusion, pt c/o feeling \"like my blood pressure is dropping. \"  Feraheme stopped and BP checked. BP WNL. Pt then c/o chest tightness. Denied chest pain and SOB. IVF started. Dr Jaky Young called to chairside. 125 mg solumedrol given and symptoms resolved with in a few minutes. Ok to restart Feraheme at a slower rate per Dr Jaky Young. Pt tolerated remainder of infusion. Observed for 30 minutes post Feraheme. Per order, feraheme weekly X 2. Pt requesting to have next Feraheme appointment on 8/9 due to her surgery schedules. Irlanda Chahal Dr [facility] RN notified of above. Discharged home in stable condition, no new complaints.

## 2023-07-18 NOTE — PROGRESS NOTES
Patient developed chest tightness toward the end of her Feraheme infusion. She states the symptoms felt similar to her typical Prinzmetal anginal symptoms. Vitals remained stable. Physical exam was unremarkable. Feraheme infusion held and Solu-Medrol 125 mg IV given. Patient felt back to baseline within a few minutes and Feraheme infusion was resumed and completed without further complication.     Min Stevens MD  Hematology/Medical Oncology  [facility]

## 2023-07-19 ENCOUNTER — TELEPHONE (OUTPATIENT)
Dept: HEMATOLOGY/ONCOLOGY | Facility: HOSPITAL | Age: 68
End: 2023-07-19

## 2023-07-19 NOTE — TELEPHONE ENCOUNTER
Patient called post reaction. She is doing better today,  did have some issues with sleeping otherwise finally able to sleep. Was concerned about steroid given and any it causing any kidney problems as she has some kidney abnormalities. ER MD told her it was not a problem.

## 2023-07-26 RX ORDER — METHYLPREDNISOLONE SODIUM SUCCINATE 125 MG/2ML
125 INJECTION, POWDER, LYOPHILIZED, FOR SOLUTION INTRAMUSCULAR; INTRAVENOUS ONCE
Start: 2023-07-26 | End: 2023-07-26

## 2023-08-03 ENCOUNTER — OFFICE VISIT (OUTPATIENT)
Dept: HEMATOLOGY/ONCOLOGY | Age: 68
End: 2023-08-03
Attending: INTERNAL MEDICINE
Payer: MEDICARE

## 2023-08-03 VITALS
TEMPERATURE: 97 F | WEIGHT: 202.81 LBS | BODY MASS INDEX: 38.29 KG/M2 | RESPIRATION RATE: 18 BRPM | HEIGHT: 61.02 IN | OXYGEN SATURATION: 97 % | DIASTOLIC BLOOD PRESSURE: 73 MMHG | SYSTOLIC BLOOD PRESSURE: 131 MMHG | HEART RATE: 73 BPM

## 2023-08-03 DIAGNOSIS — D50.8 IRON DEFICIENCY ANEMIA SECONDARY TO INADEQUATE DIETARY IRON INTAKE: Primary | ICD-10-CM

## 2023-08-03 PROCEDURE — 96374 THER/PROPH/DIAG INJ IV PUSH: CPT

## 2023-08-03 PROCEDURE — 96375 TX/PRO/DX INJ NEW DRUG ADDON: CPT

## 2023-08-03 RX ORDER — METHYLPREDNISOLONE SODIUM SUCCINATE 125 MG/2ML
125 INJECTION, POWDER, LYOPHILIZED, FOR SOLUTION INTRAMUSCULAR; INTRAVENOUS ONCE
Status: COMPLETED | OUTPATIENT
Start: 2023-08-03 | End: 2023-08-03

## 2023-08-03 RX ORDER — METHYLPREDNISOLONE SODIUM SUCCINATE 125 MG/2ML
125 INJECTION, POWDER, LYOPHILIZED, FOR SOLUTION INTRAMUSCULAR; INTRAVENOUS ONCE
Status: CANCELLED
Start: 2023-08-03 | End: 2023-08-03

## 2023-08-03 RX ADMIN — METHYLPREDNISOLONE SODIUM SUCCINATE 125 MG: 125 INJECTION, POWDER, LYOPHILIZED, FOR SOLUTION INTRAMUSCULAR; INTRAVENOUS at 13:14:00

## 2023-08-03 NOTE — PROGRESS NOTES
Education Record    Learner:  Patient    Disease / Diagnosis: here for feraheme    Barriers / Limitations:  None    Method:  Brief focused, printed material and  reinforcement    General Topics:  Plan of care reviewed    Outcome: patient ambulatory. Arrived with friend. Aware to get labs done 1 month after surgery. Shows understanding. Tolerated infusion. Monitored for 30 min post. VSS. Discharged in stable condition.

## 2023-08-09 ENCOUNTER — APPOINTMENT (OUTPATIENT)
Dept: HEMATOLOGY/ONCOLOGY | Age: 68
End: 2023-08-09
Attending: INTERNAL MEDICINE
Payer: MEDICARE

## 2023-09-13 ENCOUNTER — OFFICE VISIT (OUTPATIENT)
Dept: HEMATOLOGY/ONCOLOGY | Age: 68
End: 2023-09-13
Attending: INTERNAL MEDICINE
Payer: MEDICARE

## 2023-09-13 VITALS
OXYGEN SATURATION: 98 % | RESPIRATION RATE: 18 BRPM | HEIGHT: 61.02 IN | SYSTOLIC BLOOD PRESSURE: 116 MMHG | BODY MASS INDEX: 37.95 KG/M2 | HEART RATE: 80 BPM | TEMPERATURE: 97 F | DIASTOLIC BLOOD PRESSURE: 76 MMHG | WEIGHT: 201 LBS

## 2023-09-13 DIAGNOSIS — D50.9 IRON DEFICIENCY ANEMIA, UNSPECIFIED IRON DEFICIENCY ANEMIA TYPE: ICD-10-CM

## 2023-09-13 LAB
BASOPHILS # BLD AUTO: 0.12 X10(3) UL (ref 0–0.2)
BASOPHILS NFR BLD AUTO: 1.5 %
DEPRECATED HBV CORE AB SER IA-ACNC: 95.4 NG/ML
EOSINOPHIL # BLD AUTO: 0.3 X10(3) UL (ref 0–0.7)
EOSINOPHIL NFR BLD AUTO: 3.8 %
ERYTHROCYTE [DISTWIDTH] IN BLOOD BY AUTOMATED COUNT: 21 %
HCT VFR BLD AUTO: 42.9 %
HGB BLD-MCNC: 14.3 G/DL
IMM GRANULOCYTES # BLD AUTO: 0.04 X10(3) UL (ref 0–1)
IMM GRANULOCYTES NFR BLD: 0.5 %
IRON SATN MFR SERPL: 31 %
IRON SERPL-MCNC: 111 UG/DL
LYMPHOCYTES # BLD AUTO: 1.74 X10(3) UL (ref 1–4)
LYMPHOCYTES NFR BLD AUTO: 21.9 %
MCH RBC QN AUTO: 28.5 PG (ref 26–34)
MCHC RBC AUTO-ENTMCNC: 33.3 G/DL (ref 31–37)
MCV RBC AUTO: 85.6 FL
MONOCYTES # BLD AUTO: 0.55 X10(3) UL (ref 0.1–1)
MONOCYTES NFR BLD AUTO: 6.9 %
NEUTROPHILS # BLD AUTO: 5.21 X10 (3) UL (ref 1.5–7.7)
NEUTROPHILS # BLD AUTO: 5.21 X10(3) UL (ref 1.5–7.7)
NEUTROPHILS NFR BLD AUTO: 65.4 %
PLATELET # BLD AUTO: 283 10(3)UL (ref 150–450)
RBC # BLD AUTO: 5.01 X10(6)UL
TIBC SERPL-MCNC: 361 UG/DL (ref 240–450)
TRANSFERRIN SERPL-MCNC: 242 MG/DL (ref 200–360)
WBC # BLD AUTO: 8 X10(3) UL (ref 4–11)

## 2023-09-13 PROCEDURE — 99214 OFFICE O/P EST MOD 30 MIN: CPT | Performed by: INTERNAL MEDICINE

## 2023-10-04 ENCOUNTER — OFFICE VISIT (OUTPATIENT)
Facility: CLINIC | Age: 68
End: 2023-10-04
Payer: MEDICARE

## 2023-10-04 VITALS
BODY MASS INDEX: 38 KG/M2 | HEART RATE: 76 BPM | WEIGHT: 201 LBS | SYSTOLIC BLOOD PRESSURE: 130 MMHG | OXYGEN SATURATION: 98 % | DIASTOLIC BLOOD PRESSURE: 64 MMHG

## 2023-10-04 DIAGNOSIS — E23.7 PITUITARY ABNORMALITY (HCC): ICD-10-CM

## 2023-10-04 DIAGNOSIS — Z13.29 SCREENING FOR HYPOTHYROIDISM: ICD-10-CM

## 2023-10-04 DIAGNOSIS — E24.9 HYPERCORTISOLISM (HCC): Primary | ICD-10-CM

## 2023-10-04 PROCEDURE — 99204 OFFICE O/P NEW MOD 45 MIN: CPT | Performed by: STUDENT IN AN ORGANIZED HEALTH CARE EDUCATION/TRAINING PROGRAM

## 2023-10-04 RX ORDER — DEXAMETHASONE 1 MG
1 TABLET ORAL ONCE
Qty: 1 TABLET | Refills: 0 | Status: SHIPPED | OUTPATIENT
Start: 2023-10-04 | End: 2023-10-04

## 2023-10-04 NOTE — PATIENT INSTRUCTIONS
In order to evaluate you for Cushing's (disease or syndrome), we will have you perform a dexamethasone suppression test. Please take 1mg of dexamethasone at 11PM and then the following morning, please have your blood tests drawn close to 8AM on an empty stomach. We will also check your thyroid function after your surgery to make sure everything there is normal.   Depending on the results, we'll discuss if we need any further investigation.      Return Visit   [  ] Physician in 1 month  [  ] In person or video visit  [  ] In person only    [  ] After visit summary   [X] Fasting/8AM labs  [  ] Central scheduling # for ultrasound/nuclear med/CT/MRI/DXA  [X] Directions to 1st floor lab to collect urine collection jug/salivary cortisol tubes  [  ] Med rep info for:  [  ] Dental clearance form  [  ] Will need authorization for outside records  [  ] Give blood sugar log  [  ] Other:

## 2023-10-05 ENCOUNTER — LAB ENCOUNTER (OUTPATIENT)
Dept: LAB | Age: 68
End: 2023-10-05
Attending: STUDENT IN AN ORGANIZED HEALTH CARE EDUCATION/TRAINING PROGRAM
Payer: MEDICARE

## 2023-10-05 DIAGNOSIS — E24.9 HYPERCORTISOLISM (HCC): ICD-10-CM

## 2023-10-05 DIAGNOSIS — Z13.29 SCREENING FOR HYPOTHYROIDISM: ICD-10-CM

## 2023-10-05 LAB
CORTIS SERPL-MCNC: 1.2 UG/DL
T3 SERPL-MCNC: 113 NG/DL (ref 60–181)
T4 FREE SERPL-MCNC: 0.7 NG/DL (ref 0.8–1.7)
TSI SER-ACNC: 1.08 MIU/ML (ref 0.36–3.74)

## 2023-10-05 PROCEDURE — 84443 ASSAY THYROID STIM HORMONE: CPT

## 2023-10-05 PROCEDURE — 80299 QUANTITATIVE ASSAY DRUG: CPT

## 2023-10-05 PROCEDURE — 36415 COLL VENOUS BLD VENIPUNCTURE: CPT

## 2023-10-05 PROCEDURE — 82024 ASSAY OF ACTH: CPT

## 2023-10-05 PROCEDURE — 82533 TOTAL CORTISOL: CPT

## 2023-10-05 PROCEDURE — 84480 ASSAY TRIIODOTHYRONINE (T3): CPT

## 2023-10-05 PROCEDURE — 84439 ASSAY OF FREE THYROXINE: CPT

## 2023-10-06 LAB — ACTH: 4.2 PG/ML

## 2023-10-13 ENCOUNTER — PATIENT MESSAGE (OUTPATIENT)
Facility: CLINIC | Age: 68
End: 2023-10-13

## 2023-10-13 LAB — DEXAMETHASONE, SERUM: 690 NG/DL

## 2023-11-08 ENCOUNTER — LAB ENCOUNTER (OUTPATIENT)
Dept: LAB | Facility: HOSPITAL | Age: 68
End: 2023-11-08
Attending: STUDENT IN AN ORGANIZED HEALTH CARE EDUCATION/TRAINING PROGRAM
Payer: MEDICARE

## 2023-11-08 ENCOUNTER — OFFICE VISIT (OUTPATIENT)
Facility: CLINIC | Age: 68
End: 2023-11-08
Payer: MEDICARE

## 2023-11-08 VITALS
WEIGHT: 201 LBS | RESPIRATION RATE: 18 BRPM | OXYGEN SATURATION: 97 % | HEART RATE: 97 BPM | BODY MASS INDEX: 37.95 KG/M2 | HEIGHT: 61 IN | DIASTOLIC BLOOD PRESSURE: 80 MMHG | SYSTOLIC BLOOD PRESSURE: 130 MMHG

## 2023-11-08 DIAGNOSIS — N18.32 CHRONIC RENAL IMPAIRMENT, STAGE 3B (HCC): ICD-10-CM

## 2023-11-08 DIAGNOSIS — E03.8 CENTRAL HYPOTHYROIDISM: Primary | ICD-10-CM

## 2023-11-08 LAB
ANION GAP SERPL CALC-SCNC: 4 MMOL/L (ref 0–18)
BUN BLD-MCNC: 16 MG/DL (ref 9–23)
CALCIUM BLD-MCNC: 9.1 MG/DL (ref 8.5–10.1)
CHLORIDE SERPL-SCNC: 111 MMOL/L (ref 98–112)
CO2 SERPL-SCNC: 26 MMOL/L (ref 21–32)
CORTIS SERPL-MCNC: 13.7 UG/DL
CREAT BLD-MCNC: 1.18 MG/DL
EGFRCR SERPLBLD CKD-EPI 2021: 51 ML/MIN/1.73M2 (ref 60–?)
FASTING STATUS PATIENT QL REPORTED: YES
FSH SERPL-ACNC: 45.5 MIU/ML
GLUCOSE BLD-MCNC: 114 MG/DL (ref 70–99)
LH SERPL-ACNC: 14.7 MIU/ML
OSMOLALITY SERPL CALC.SUM OF ELEC: 294 MOSM/KG (ref 275–295)
POTASSIUM SERPL-SCNC: 4.5 MMOL/L (ref 3.5–5.1)
PROLACTIN SERPL-MCNC: 9.6 NG/ML
SODIUM SERPL-SCNC: 141 MMOL/L (ref 136–145)
T3 SERPL-MCNC: 104 NG/DL (ref 60–181)
T4 FREE SERPL-MCNC: 0.6 NG/DL (ref 0.8–1.7)
TSI SER-ACNC: 1.86 MIU/ML (ref 0.36–3.74)

## 2023-11-08 PROCEDURE — 84480 ASSAY TRIIODOTHYRONINE (T3): CPT | Performed by: STUDENT IN AN ORGANIZED HEALTH CARE EDUCATION/TRAINING PROGRAM

## 2023-11-08 PROCEDURE — 84439 ASSAY OF FREE THYROXINE: CPT | Performed by: STUDENT IN AN ORGANIZED HEALTH CARE EDUCATION/TRAINING PROGRAM

## 2023-11-08 PROCEDURE — 84146 ASSAY OF PROLACTIN: CPT | Performed by: STUDENT IN AN ORGANIZED HEALTH CARE EDUCATION/TRAINING PROGRAM

## 2023-11-08 PROCEDURE — 84443 ASSAY THYROID STIM HORMONE: CPT | Performed by: STUDENT IN AN ORGANIZED HEALTH CARE EDUCATION/TRAINING PROGRAM

## 2023-11-08 PROCEDURE — 82533 TOTAL CORTISOL: CPT | Performed by: STUDENT IN AN ORGANIZED HEALTH CARE EDUCATION/TRAINING PROGRAM

## 2023-11-08 PROCEDURE — 84305 ASSAY OF SOMATOMEDIN: CPT | Performed by: STUDENT IN AN ORGANIZED HEALTH CARE EDUCATION/TRAINING PROGRAM

## 2023-11-08 PROCEDURE — 83002 ASSAY OF GONADOTROPIN (LH): CPT | Performed by: STUDENT IN AN ORGANIZED HEALTH CARE EDUCATION/TRAINING PROGRAM

## 2023-11-08 PROCEDURE — 36415 COLL VENOUS BLD VENIPUNCTURE: CPT | Performed by: STUDENT IN AN ORGANIZED HEALTH CARE EDUCATION/TRAINING PROGRAM

## 2023-11-08 PROCEDURE — 84481 FREE ASSAY (FT-3): CPT | Performed by: STUDENT IN AN ORGANIZED HEALTH CARE EDUCATION/TRAINING PROGRAM

## 2023-11-08 PROCEDURE — 83001 ASSAY OF GONADOTROPIN (FSH): CPT | Performed by: STUDENT IN AN ORGANIZED HEALTH CARE EDUCATION/TRAINING PROGRAM

## 2023-11-08 PROCEDURE — 99215 OFFICE O/P EST HI 40 MIN: CPT | Performed by: STUDENT IN AN ORGANIZED HEALTH CARE EDUCATION/TRAINING PROGRAM

## 2023-11-08 PROCEDURE — 80048 BASIC METABOLIC PNL TOTAL CA: CPT

## 2023-11-08 PROCEDURE — 83520 IMMUNOASSAY QUANT NOS NONAB: CPT | Performed by: STUDENT IN AN ORGANIZED HEALTH CARE EDUCATION/TRAINING PROGRAM

## 2023-11-09 ENCOUNTER — PATIENT MESSAGE (OUTPATIENT)
Facility: CLINIC | Age: 68
End: 2023-11-09

## 2023-11-09 ENCOUNTER — LAB ENCOUNTER (OUTPATIENT)
Dept: LAB | Age: 68
End: 2023-11-09
Attending: STUDENT IN AN ORGANIZED HEALTH CARE EDUCATION/TRAINING PROGRAM
Payer: MEDICARE

## 2023-11-09 DIAGNOSIS — E03.8 CENTRAL HYPOTHYROIDISM: ICD-10-CM

## 2023-11-09 DIAGNOSIS — E03.8 CENTRAL HYPOTHYROIDISM: Primary | ICD-10-CM

## 2023-11-09 LAB — T3FREE SERPL-MCNC: 2.31 PG/ML (ref 2.4–4.2)

## 2023-11-09 PROCEDURE — 84305 ASSAY OF SOMATOMEDIN: CPT

## 2023-11-09 PROCEDURE — 36415 COLL VENOUS BLD VENIPUNCTURE: CPT

## 2023-11-09 PROCEDURE — 84439 ASSAY OF FREE THYROXINE: CPT

## 2023-11-09 RX ORDER — LEVOTHYROXINE SODIUM 0.1 MG/1
100 TABLET ORAL
Qty: 90 TABLET | Refills: 0 | OUTPATIENT
Start: 2023-11-09

## 2023-11-09 RX ORDER — LEVOTHYROXINE SODIUM 0.1 MG/1
100 TABLET ORAL
Qty: 30 TABLET | Refills: 0 | Status: SHIPPED | OUTPATIENT
Start: 2023-11-09

## 2023-11-09 NOTE — TELEPHONE ENCOUNTER
RN phoned patient to discuss [facility] questions:   Note from Dr. Mary Rob: I'll answer her questions as best I can - I don't have a slot for a virtual appointment with her today. 1) Her full pituitary workup is completely normal aside from the thyroid, so I do not expect that she has a pituitary mass as ordinarily you lose the thyroid function last when you have a pituitary adenoma. Since her prolactin, FSH/LH, cortisol were normal this argues against a pituitary tumor large enough to cause visual changes (and those vision changes would be very specific - LOSS of peripheral visual fields. Blurred vision and changing prescriptions are not a feature of pituitary tumors, which are usually more related to the eyes themselves). 2) I need to see the pending labs (which are already being processed in the lab) before I am able to justify another MRI since her existing lab results do not match with a pituitary problem (meaning, if it ultimately turns out that her low thyroid hormones are an error of the lab and not a true deficiency, she does not need either levothyroxine or pituitary imaging). 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 They are benign (noncancerous) and usually do not require further investigation or therapy. This cyst would not have anything to do with her pituitary gland or thyroid. 4) Since we know her cortisol is normal, we can start levothyroxine \"empirically\" (meaning, before proving 100% that it is necessary) since it has low risk for causing harm even if she doesn't ultimately need it. She could start it while we are waiting for her remaining labs to result and then discontinue it if we ultimately find out that she doesn't need it.  If she wants to go ahead and start it, we can give her 30 days of levothyroxine 100mcg daily to be taken 30min before food/other medications. 5) I will reach out to her as soon as I have her remaining labs back. Patient verbalized understanding. Confirms she will go have labs re- drawn. Would like to start Levothyroxine 100 mcg 30 day supply. [facility] sent with lab locations.

## 2023-11-09 NOTE — TELEPHONE ENCOUNTER
From: Elyssa Claros  Rebecca Sanders  Sent: 11/9/2023 10:03 AM CST  Subject: Followup questions from yesterday    I have some questions Doctor and thank you so much for your time and kindness yesterday! *I had a new RX for eyeglasses and received them on 8/5; wore them for 2 weeks and could not see. So on 8/24 was retested and vision changed and they remade glasses to new RX. Wore them for 2 weeks and could not see so I went back to my glasses from 11/2021. Yesterday I went back into the eye  and had her change the lenses to the RX from 11/2021. *I have seen double for years and the double vision has increased since this Spring. I do have prisms in my glasses/lenses. *Can we do the MRI asap, please? *When will I begin the RX therapy for the hypothyroidism? *I saw that the 8 mm possible cyst was found in an MRI from [facility] back in 3/23. Do you know why that was not investigated at that time? Could this condition have been brewing since that time? *If you think that we need an audio appointment please let me know.  I have a free day, today until I talk to Dr. Mallorie Huerta, auto immunologist about the protein spike, tonight at 7:15 pm.    Thanks again,  [employer]

## 2023-11-09 NOTE — TELEPHONE ENCOUNTER
LOV visit 11/08- -Pending results, may consider dedicated MRI sella for better pituitary evaluation of central hypothyroidism is still a consideration. She does report daily headaches but denies peripheral vision loss. -May consider levothyroxine replacement pending results    Routed to Dr. Edwina Jules for review.

## 2023-11-09 NOTE — TELEPHONE ENCOUNTER
Spoke with patient in [facility] TE 11/09- she would like us to wait on the rest of the labs to result before send them over to PCP and Autoimunologist.    Provided her with office fax number. Will await lab results.

## 2023-11-09 NOTE — TELEPHONE ENCOUNTER
Patient is requesting labs to be sent to PCP and autoimmune specialist.    It looks like a few labs read as \"active in process\" will wait for these to result and then fax labs to appropriate place.

## 2023-11-09 NOTE — TELEPHONE ENCOUNTER
RN phoned pharmacy and confirmed that 90 day request is just automated from patient preference. RX denied as duplicate. RN phoned patient to confirm that patient will get lab work done before starting medication. Patient is currently at lab getting this done.

## 2023-11-09 NOTE — TELEPHONE ENCOUNTER
From: Connie Moore  Lauren Montague  Sent: 11/9/2023 9:37 AM CST  Subject: Please send lab results to:    Morning! Can you please send all of my labs to:    My GP: Dr. Johanne Baker - phone: [phone]   - fax: [phone]    My Autoimunologist: Dr. Paige  Phone - [phone]  Fax - [phone]      Also; what is the fax number to your office please?     Thank you,  Colby Patel Transposition Flap Text: The defect edges were debeveled with a #15 scalpel blade.  Given the location of the defect and the proximity to free margins a transposition flap was deemed most appropriate.  Using a sterile surgical marker, an appropriate transposition flap was drawn incorporating the defect.    The area thus outlined was incised deep to adipose tissue with a #15 scalpel blade.  The skin margins were undermined to an appropriate distance in all directions utilizing iris scissors.

## 2023-11-10 ENCOUNTER — TELEPHONE (OUTPATIENT)
Facility: CLINIC | Age: 68
End: 2023-11-10

## 2023-11-10 ENCOUNTER — TELEPHONE (OUTPATIENT)
Dept: HEMATOLOGY/ONCOLOGY | Facility: HOSPITAL | Age: 68
End: 2023-11-10

## 2023-11-10 NOTE — TELEPHONE ENCOUNTER
Hi! It usually takes at least a week for levothyroxine to build up in the system and take effect. We also don't know if all of her symptoms are due to the thyroid, so if any symptoms persistent after her thyroid function normalizes, they are due to something other than the thyroid. But hopefully within about 1-2 weeks she should feel a difference. Do I need to reorder the LT4?

## 2023-11-10 NOTE — TELEPHONE ENCOUNTER
Patient phoning RN stating she's unable to read [facility] sent earlier today per Jose Maria Parmar RN:  [facility],     Dr. Lucie Henriquez said since you got your labs done, you are free to start your medication if you would like to! She said It may take a bit for the one yesterday to result. But she said it is up to you if you would like to wait for the results to begin. Jose Maria Parmar, RN\"    Patient notified of above and states that pharmacy needs the \"okay\" from Dr. Lucie Henriquez to take LT4 Rx \"off hold\". (Rx was on hold d/t pt needing labs done prior to starting). Pt aware labs Free T4, IGF-1, and Human Antimouse Antibodies are still in process; pt to call Endo office next week to see if labs resulted. Pt is asking when she should start feeling better after starting the Levothyroxine. Will route message to Dr. Lucie Henriquez.

## 2023-11-10 NOTE — TELEPHONE ENCOUNTER
Patient is calling because another DrJuan Is e-mailing Dr. Ru Prieto this morning because her lab work came back bad and Dr. Barney iPnk is very concerned and he want this patient seen ASAP.

## 2023-11-10 NOTE — TELEPHONE ENCOUNTER
RN phoned pharmacy to see about medication \"hold\" on medication. Joo Calderon, Pharmacist, confirmed that this can be put through for the 30 days. Will get this ready for patient. RN phoned patient to let her know that pharmacy will contact her when RX is ready. Patient verbalized understanding that it may take a few weeks to feel symptom relief after starting Levothyroxine. Patient will reach out with any additional questions.

## 2023-11-10 NOTE — TELEPHONE ENCOUNTER
Pt phoned clinic requesting that we inform Dr. Hermann Richardson that pt received a Meningococcal vaccine and HIB vaccine and is concerned that this may affect pts lab results. Informed pt that Dr. Hermann Richardson is out for the day and will be back in the office on Monday but that I will route this message to her. Pt verbalized understanding.

## 2023-11-12 LAB
IGF I: 103 NG/ML
IGF-1, Z SCORE: -0.2 S.D.

## 2023-11-13 ENCOUNTER — TELEPHONE (OUTPATIENT)
Facility: CLINIC | Age: 68
End: 2023-11-13

## 2023-11-13 ENCOUNTER — PATIENT MESSAGE (OUTPATIENT)
Facility: CLINIC | Age: 68
End: 2023-11-13

## 2023-11-13 NOTE — TELEPHONE ENCOUNTER
Patient phoned in wondering about lab results- reviewed with patient that Free t4 is still pended for result. Dr. Adele Guzman would like to see all the labs before moving forward with a plan. Patient verbalized understanding and will await to hear back once all labs have come back.

## 2023-11-13 NOTE — TELEPHONE ENCOUNTER
[facility] sent by patient in response to IG-F lab result note. [facility] routed to Dr. Iman Otto for review.

## 2023-11-13 NOTE — TELEPHONE ENCOUNTER
Hi -     Sounds good, it is totally reasonable to wait to start on the levothyroxine until we have the remaining lab result back. I'll be in touch whenever it comes back and follow along with her other physicians' evaluations. Thanks!

## 2023-11-13 NOTE — TELEPHONE ENCOUNTER
From: Riley Deras  Sent: 11/13/2023 1:18 PM CST  Subject: [facility] results    Hi :    Thank you for the note about the growth hormone results. I have not started the levothyroxine medication yet until we hear from the other labs. My  and I are still deciding whether or not to travel to [facility] this Thursday AM for 12 days. We thought waiting on the results might be a good idea and I see Dr. Praveen Sands from [facility] tomorrow. My auto immune labs were such that Dr. Светлана Bustamante suggested I see Dr. Praveen Sadns for a follow up. Thanks for being so kind and knowledgeable!     Qi Otto

## 2023-11-13 NOTE — TELEPHONE ENCOUNTER
11/13/23 vd via fax from [id number] [facility] Report from [address] in Dr. Salomón Alexis In box onn desk

## 2023-11-13 NOTE — TELEPHONE ENCOUNTER
Rn phoned patient in regards to lab results- noticed TE and address with patient as well. Patient aware that Vaccines should not interfere with her recent blood draw. Closing this encounter.

## 2023-11-14 ENCOUNTER — OFFICE VISIT (OUTPATIENT)
Dept: HEMATOLOGY/ONCOLOGY | Facility: HOSPITAL | Age: 68
End: 2023-11-14
Attending: INTERNAL MEDICINE
Payer: MEDICARE

## 2023-11-14 VITALS
WEIGHT: 200 LBS | SYSTOLIC BLOOD PRESSURE: 154 MMHG | TEMPERATURE: 95 F | DIASTOLIC BLOOD PRESSURE: 83 MMHG | HEART RATE: 89 BPM | RESPIRATION RATE: 18 BRPM | OXYGEN SATURATION: 96 % | BODY MASS INDEX: 37.76 KG/M2 | HEIGHT: 60.98 IN

## 2023-11-14 DIAGNOSIS — D50.8 IRON DEFICIENCY ANEMIA SECONDARY TO INADEQUATE DIETARY IRON INTAKE: Primary | ICD-10-CM

## 2023-11-14 DIAGNOSIS — R76.8 LOW IMMUNOGLOBULIN LEVEL: ICD-10-CM

## 2023-11-14 LAB
HUMAN ANTI-MOUSE ANTIBODIES: <56 NG/ML
HUMAN ANTI-MOUSE ANTIBODIES: <56 NG/ML

## 2023-11-14 PROCEDURE — 99214 OFFICE O/P EST MOD 30 MIN: CPT | Performed by: INTERNAL MEDICINE

## 2023-11-14 RX ORDER — CYANOCOBALAMIN 1000 UG/ML
1000 INJECTION, SOLUTION INTRAMUSCULAR; SUBCUTANEOUS
COMMUNITY
Start: 2023-11-02

## 2023-11-14 NOTE — PROGRESS NOTES
Education Record    Learner:  Patient    Disease / Diagnosis: Hx anemia    Barriers / Limitations:  None   Comments:    Method:  Demonstration   Comments:    General Topics:  Medication and Plan of care reviewed   Comments:    Outcome:  Shows understanding   Comments:    Here for follow up after labs done by immunologist.

## 2023-11-15 ENCOUNTER — PATIENT MESSAGE (OUTPATIENT)
Facility: CLINIC | Age: 68
End: 2023-11-15

## 2023-11-15 NOTE — TELEPHONE ENCOUNTER
From: Irvin Tapia Forth  Sent: 11/15/2023 11:15 AM CST  Subject: Do I need an MRI    Morning! I was wondering if you had the results of the blood work that was determining if an MRI would be ordered? We were hoping to leave for [facility] tomorrow for 11 days. Thanks!   Matthew Saleh

## 2023-11-22 ENCOUNTER — TELEPHONE (OUTPATIENT)
Dept: NEUROLOGY | Facility: CLINIC | Age: 68
End: 2023-11-22

## 2023-11-22 NOTE — TELEPHONE ENCOUNTER
Pt is calling in regards to her upcoming appt, questioning whether or not she needs any testing done prior to her appt in December.  Please advise

## 2023-12-05 DIAGNOSIS — E03.8 CENTRAL HYPOTHYROIDISM: ICD-10-CM

## 2023-12-05 RX ORDER — LEVOTHYROXINE SODIUM 0.1 MG/1
100 TABLET ORAL
Qty: 30 TABLET | Refills: 0 | OUTPATIENT
Start: 2023-12-05

## 2023-12-05 NOTE — TELEPHONE ENCOUNTER
Per lab result note sent to patient on 11/21: Overall, this is great news! It would not have been wrong to start the levothyroxine, but you do not need it based on these labs. You do not need a brain MRI as your TSH no longer seems to be inappropriate. Refill denied as inappropriate and patient does not need to be taking due to updated lab results.

## 2023-12-11 ENCOUNTER — OFFICE VISIT (OUTPATIENT)
Dept: NEUROLOGY | Facility: CLINIC | Age: 68
End: 2023-12-11
Payer: MEDICARE

## 2023-12-11 VITALS
OXYGEN SATURATION: 96 % | DIASTOLIC BLOOD PRESSURE: 70 MMHG | SYSTOLIC BLOOD PRESSURE: 132 MMHG | WEIGHT: 201 LBS | RESPIRATION RATE: 16 BRPM | BODY MASS INDEX: 38 KG/M2 | HEART RATE: 64 BPM

## 2023-12-11 DIAGNOSIS — G44.40 MEDICATION OVERUSE HEADACHE: ICD-10-CM

## 2023-12-11 DIAGNOSIS — I20.1 PRINZMETAL ANGINA (HCC): ICD-10-CM

## 2023-12-11 DIAGNOSIS — R47.89 WORD FINDING DIFFICULTY: ICD-10-CM

## 2023-12-11 DIAGNOSIS — G43.919 REFRACTORY MIGRAINE WITH AURA: Primary | ICD-10-CM

## 2023-12-11 RX ORDER — VERAPAMIL HYDROCHLORIDE 240 MG/1
TABLET, FILM COATED, EXTENDED RELEASE ORAL
Qty: 30 TABLET | Refills: 2 | Status: SHIPPED | OUTPATIENT
Start: 2023-12-11

## 2023-12-11 RX ORDER — BUTALBITAL, ACETAMINOPHEN AND CAFFEINE 50; 325; 40 MG/1; MG/1; MG/1
TABLET ORAL
Qty: 15 TABLET | Refills: 1 | Status: SHIPPED | OUTPATIENT
Start: 2023-12-11

## 2023-12-11 RX ORDER — METHYLPREDNISOLONE 4 MG/1
TABLET ORAL
Qty: 1 EACH | Refills: 0 | Status: SHIPPED | OUTPATIENT
Start: 2023-12-11

## 2023-12-11 RX ORDER — DEXAMETHASONE SODIUM PHOSPHATE 10 MG/ML
10 INJECTION INTRAMUSCULAR; INTRAVENOUS ONCE
Status: COMPLETED | OUTPATIENT
Start: 2023-12-11 | End: 2023-12-11

## 2023-12-11 RX ADMIN — DEXAMETHASONE SODIUM PHOSPHATE 10 MG: 10 INJECTION INTRAMUSCULAR; INTRAVENOUS at 11:31:00

## 2023-12-11 NOTE — PROGRESS NOTES
Patient states increase in migraines sine last office visit. Patient states migraines increase in November. Patient states the two weeks of migraines has changed her speech, patient is having difficulty with word finding.

## 2023-12-11 NOTE — PROGRESS NOTES
[address]   Neurology    Edison Montano Patient Status:  No patient class for patient encounter    1955 MRN [id number]   Location [facility] PCP Flower Hernandes MD              HPI:   Edison Montano is a(n) 80year old female with history of HTN, HL, CKD, vertigo, migraines, history of complex migraines with speech changes and weakness on left side, who presents at the request of Dr. Sol Landeros for evaluation of dizziness, and numbness in the LLE and LUE. She was in the ED on 23. Also had very brief right jaw pain. Has vertigo 2-3 times a year. Two days ago she woke up with vertigo, had to lay back down. Had a mild retro-orbital R eye pain at the time, dull. Then developed slurred speech, denies facial droop. Also developed tingling on the LUE and LLE, and numb on the face. Had word finding difficulty. BIRCH persisted for a few hours, but the other symptoms lasted 1-2 hours. Migraines increased in the last few months. HA's a couple times a week. Taking emgality twice a month, sees a neurologist. Has increased fatigue. Interim  Denies any episodes of slurred speech. Had prinzmetal's angina with very high HR. Started on verapamil. For migraines, still taking emgality 240mg monthly. Has been getting 6-7 HA\"s per month. Since starting verapamil, has been getting no headaches. Taking verapamil 240mg nightly CR. Interim  Increase in migraines since  and then since . Increase with word finding difficulty with severe migraines in the last two weeks. Taking tylenol. Two months ago, her verapamil was lowered by cardiology, due to no longer need for it angina and BP.      Pertinent imaging and laboratory work-up:   Component      Latest Ref Rng 7/3/2023 2023   IGF I      52 - 196 ng/mL  103    IGF-1, Z Score      -2.0 - 2.0 S.D.  -0.2    Vitamin B12      193 - 986 pg/mL 487     TSH      0.358 - 3.740 mIU/mL 1.840 MRA brain 3/2023 personally reviewed- small CARMINA ross  CONCLUSION:  Abnormal flow state with lack of flow related enhancement within the distal aspect of the non dominant right vertebral artery. The chronicity of this is uncertain, there is no previous. This could reflect severe stenosis, or occlusion of   this vessel. However the basilar arteries patent, the left TB arteries patent, and there is patency of the posterior circulation vessels. Clinical correlation is advised. CTA head 3/2023  CONCLUSION:  No signs of arterial occlusion. The MRA appearance is demonstrated on this CTA to reflect a developmentally extremely small terminal right vertebral artery; this right vertebral artery is non dominant, smaller than the left, and becomes   even smaller after giving off terminal branches, an anatomic variant. MRI/MRA brain 3/2023    CONCLUSION:  No acute infarct,, or other acute process. 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 CONCLUSION:  Abnormal flow state with lack of flow related enhancement within the distal aspect of the non dominant right vertebral artery. The chronicity of this is uncertain, there is no previous. This could reflect severe stenosis, or occlusion of   this vessel. However the basilar arteries patent, the left TB arteries patent, and there is patency of the posterior circulation vessels. Clinical correlation is advised.       2022  LDL 76  A1c 5.5      Past Medical History:  Past Medical History:   Diagnosis Date    Allergic rhinitis     Anxiety     Back pain     Blood disorder     Edema     Esophageal reflux     Essential (primary) hypertension 3/5/2015    High blood pressure     High cholesterol     Hyperlipidemia, unspecified 2015    IBS (irritable bowel syndrome)     Migraines     Sleep apnea     Stroke ([facility] [address]     TIA (transient ischemic attack)         Past Surgical History:  Past Surgical History:   Procedure Laterality Date     CHOLECYSTECTOMY      ESOPHAGEAL MANOMETRY - INTERNAL  08/02/2021         HYSTERECTOMY      OTHER SURGICAL HISTORY      remove hernias numerous and place pelvis mesh and adhension     OTHER SURGICAL HISTORY      Repaired bladder and colon     STEREOTACT STIM SPINAL CORD  08/2023    VENTRAL HERNIA REPAIR  09/18/2017    [facility]       Family History:  family history includes Cancer in her brother, father, and mother; Crohn's Disease in her father; Heart Disorder in her mother; Lupus anticogulant in her daughter; Migraines in her brother, mother, and sister; Other in her father and sister; putiuary dwarfism in her son. Social History:   reports that she has never smoked. She has never used smokeless tobacco. She reports current alcohol use. She reports that she does not currently use drugs. Allergies:   Allergies   Allergen Reactions    Amitriptyline ANAPHYLAXIS    Bexarotene ANAPHYLAXIS    Dilaudid [Hydromorphone] ANAPHYLAXIS    Hydrocodone ANAPHYLAXIS     itching    Morphine ANAPHYLAXIS     itching    Oxycodone ANAPHYLAXIS    Cefdinir HIVES    Clarithromycin HIVES    Gabapentin SWELLING     Joint swelling    Propantheline SWELLING    Trazodone OTHER (SEE COMMENTS)     Trouble swallowing    Vitamin D NAUSEA ONLY, CONFUSION and DIZZINESS     5000 iu     Latex OTHER (SEE COMMENTS)     Redness and blistering occurs with contact to latex  sensitivity      Darvocet [Propoxyphene N-Apap] ANAPHYLAXIS     Drug no longer available    Darvon [Bexophene] ANAPHYLAXIS     Drug no longer available    Propoxyphene SWELLING     Drug no longer available       MEDICATIONS:    Current Outpatient Medications:     Nutritional Supplements (PYCNOGENOL OR), Take by mouth., Disp: , Rfl:     Lactobacillus Rhamnosus, GG, (CULTURELLE OR), Take by mouth., Disp: , Rfl:     verapamil  MG Oral Tab CR, Take 1 tablet nightly, Disp: 30 tablet, Rfl: 2    butalbital-acetaminophen-caffeine -40 MG Oral Tab, Take at onset of migraine, can repeat in 4-6 hours. Do not take more than 2-3 times a week, Disp: 15 tablet, Rfl: 1    methylPREDNISolone (MEDROL) 4 MG Oral Tablet Therapy Pack, Take as directed, Disp: 1 each, Rfl: 0    cyanocobalamin 1000 MCG/ML Injection Solution, Inject 1 mL (1,000 mcg total) into the muscle every 14 (fourteen) days. , Disp: , Rfl:     busPIRone 10 MG Oral Tab, Take 1 tablet (10 mg total) by mouth 2 (two) times daily. , Disp: , Rfl:     spironolactone 50 MG Oral Tab, Take 1 tablet (50 mg total) by mouth daily. , Disp: 30 tablet, Rfl: 12    temazepam 30 MG Oral Cap, Take 1 capsule (30 mg total) by mouth nightly. TAKE AT BEDTIME, Disp: , Rfl:     butalbital-acetaminophen-caffeine -40 MG Oral Tab, TAKE 1 TABLET BY MOUTH EVERY 12 HOURS AS NEEDED FOR MIGRAINSES, Disp: , Rfl:     MONTELUKAST 10 MG Oral Tab, TAKE 1 TABLET BY MOUTH EVERY DAY, Disp: 90 tablet, Rfl: 0    LEVOCETIRIZINE DIHYDROCHLORIDE OR, Take by mouth., Disp: , Rfl:     rosuvastatin 5 MG Oral Tab, Take 1 tablet (5 mg total) by mouth nightly., Disp: 90 tablet, Rfl: 3    furosemide 20 MG Oral Tab, Take 1 tablet (20 mg total) by mouth daily. , Disp: 90 tablet, Rfl: 3    Dexlansoprazole (DEXILANT) 60 MG Oral Capsule Delayed Release, Take 60 mg by mouth every other day. (Patient taking differently: Take 60 mg by mouth daily.), Disp: 45 capsule, Rfl: 5    EPINEPHrine (EPIPEN 2-GAEL) 0.3 MG/0.3ML Injection Solution Auto-injector, Inject 0.3 mL (1 each total) as directed one time. , Disp: 2 each, Rfl: 1    Meclizine HCl 25 MG Oral Tab, Take 1 tablet (25 mg total) by mouth 3 (three) times daily as needed. , Disp: 90 tablet, Rfl: 0    Folic Acid 5 MG Oral Cap, Take 1 mg by mouth daily. , Disp: , Rfl:     topiramate 25 MG Oral Tab, Take 1 tablet (25 mg total) by mouth daily. , Disp: , Rfl:     EMGALITY 120 MG/ML Subcutaneous Solution Auto-injector, Inject 240 mg into the skin every 30 (thirty) days. , Disp: , Rfl: 10    DULoxetine HCl 30 MG Oral Cap DR Particles, Take 1 capsule (30 mg total) by mouth once daily. , Disp: , Rfl: 2    DULoxetine HCl 60 MG Oral Cap DR Particles, Take 1 capsule (60 mg total) by mouth daily. , Disp: , Rfl: 1    SUMAtriptan Succinate 100 MG Oral Tab, 2 (two) times daily as needed. PRN, Disp: , Rfl:     Polyethylene Glycol 3350 (MIRALAX OR), Take  by mouth daily. , Disp: , Rfl:     Estradiol 10 MCG Vaginal Tab, Place vaginally. 2 times weekly , Disp: , Rfl:       Review of Systems:   A comprehensive 10 point review of systems was completed. Pertinent positives and negatives noted in the HPI. PHYSICAL EXAM:   Neurologic Exam  Vitals  Vitals:    12/11/23 1010   BP: 132/70   Pulse: 64   Resp: 16     General Appearance: Patient is a 80year old female in no acute distress  Cardiac: Normal rate & regular rhythm  Skin: There are no rashes or other skin lesions. Musculoskeletal: There is no scoliosis, or joint deformities  Neurologic examination:  Mental status: Patient is alert, attentive, and oriented x 3. Language is coherent and fluent without aphasia. Memory, comprehension and ability to follow commands were intact. Cranial nerves II-XII: Optic discs were sharp. Pupils were round and reacted to light. Extraocular movements were full. There was no face, jaw, palate or tongue weakness or atrophy. Facial sensation was normal. Hearing was grossly intact. Shoulder shrug was normal.   Motor exam revealed normal muscle bulk and tone. No atrophy or fasciculations. Manual muscle testing revealed MRC grade 5/5 strength throughout including proximal and distal muscles of the arms and legs. Deep tendon reflexes were 2 at the biceps, brachioradialis, triceps, knee jerk, and ankle jerk. Plantar responses were flexor bilaterally. Sensory exam revealed normal light touch perception. Vibratory perception and proprioception were intact at the toes. Pinprick and temperature were normal. Romberg sign was absent.   Complex motor skills revealed normal coordination. Finger-nose-finger intact. Gait was narrow and stable, was able to walk on heels, toes and tandem without any difficulty. Decrease sensation in lateral thighs     ASSESSMENT/ACTIVE PROBLEM LIST:     Encounter Diagnoses   Name Primary? Refractory migraine with aura Yes    Prinzmetal angina (HCC)     Word finding difficulty     Medication overuse headache        Discussion/Plan:  Migraines with visual aura, with slurred speech was a migraine with atypical aura, refractory, in a continuous cycle now  Intramuscular decadron today  Increase verapamil back to 240mg ER nightly  Also developing medication overuse headache  Recommend medrol dose pack  Fiorcet not more than 2-3 times per week  Continue emgality 120mg weekly    Word finding difficulty- B12 and TSH wnl, current symptoms related to migraine, notify me if worsens      Requested Prescriptions     Signed Prescriptions Disp Refills    verapamil  MG Oral Tab CR 30 tablet 2     Sig: Take 1 tablet nightly    butalbital-acetaminophen-caffeine -40 MG Oral Tab 15 tablet 1     Sig: Take at onset of migraine, can repeat in 4-6 hours. Do not take more than 2-3 times a week    methylPREDNISolone (MEDROL) 4 MG Oral Tablet Therapy Pack 1 each 0     Sig: Take as directed          We discussed in depth regarding the diagnosis, prognosis, treatment. The patient was given ample opportunity to ask questions. All questions and concerns were addressed. 35 minutes were spent on this encounter, which included obtaining and reviewing history, examining the patient, reviewing and interpreting results, building a treatment plan, discussing treatment options, discussing medication instructions, educating the patient/family/caregiver, and completing documentation, and independently interpreting results, communicating results to the patient/family/caregiver, and care coordination with the patient's other providers.       Fabrice Giraldo, DO  Neuromuscular and General Neurology  [facility]

## 2024-03-05 ENCOUNTER — PATIENT MESSAGE (OUTPATIENT)
Facility: CLINIC | Age: 69
End: 2024-03-05

## 2024-03-05 NOTE — TELEPHONE ENCOUNTER
From: Kim Hill  To: Gracie Hodgson  Sent: 3/5/2024 4:08 PM CST  Subject: Thyroid nodule    I had a scan done at our [facility] and this is the report that I received. Could you have  take a look at the area that is showing a nodule on my thyroid? Thank you so much.

## 2024-03-06 ENCOUNTER — TELEPHONE (OUTPATIENT)
Facility: CLINIC | Age: 69
End: 2024-03-06

## 2024-03-06 DIAGNOSIS — R94.6: ICD-10-CM

## 2024-03-06 DIAGNOSIS — R94.6 ABNORMAL THYROID FUNCTION TEST: ICD-10-CM

## 2024-03-06 DIAGNOSIS — E03.8 CENTRAL HYPOTHYROIDISM: Primary | ICD-10-CM

## 2024-03-06 DIAGNOSIS — E04.1 THYROID NODULE: ICD-10-CM

## 2024-03-06 NOTE — TELEPHONE ENCOUNTER
RN phoned patient per note from Dr. Hodgson- patient agreed to plan and will schedule US and then call office to schedule a f/u for after this.    Reviewed that RN will send a follow up MCM with exactly which tests have been ordered. Some of the labs took a bit longer to result last time- patient to be cognizant of this and schedule f/u to be at least one week after labs if they are a send out.     Pended US and possible future labs and routed for review.

## 2024-03-06 NOTE — TELEPHONE ENCOUNTER
Pt phoned RN this AM (pt also sent a MCM yesterday reading : \"I had a scan done at our [facility] and this is the report that I received. Could you have  take a look at the area that is showing a nodule on my thyroid? Thank you so much.\").    Pt is phoning wondering if she needs to have f/u testing regarding left thyroid \"nodule\"that was found during a \"heart scan\". On 1/20/24, pt stated she had a \"doppler\" done of her thyroid and it showed a  nodule. Pt was not experiencing any symptoms, just having a screening done.    Per pt,. GP initially put her on 0.025 mg of Levothyroxine and about a month ago increased dose to 0.05 mg. Pt is asymptomatic. Pt states she wants to be treated by Dr. Hodgson for any thyroid-related issues, not by GP.    Pt has no RTC date.    Scan results placed in Dr. Hodgson's inbox for review.    Message routed to Dr. Hodgson.

## 2024-03-06 NOTE — TELEPHONE ENCOUNTER
Hello - a subcentimeter nodule is likely nothing to worry about, nodules are very common and often are benign. Still, it would be reasonable to get at dedicated thyroid ultrasound, which I can order.     With regard to her thyroid function - low dose levothyroxine is not likely to harm her, but given that we previously demonstrated the \"standard\" thyroid lab tests are not reliable for her, I worry that her other provider may have started her on LT4 based on inaccurate lab testing. I do not want her to be taking any unnecessary medications.     I will order repeat labs for her to do. After the thyroid ultrasound and labs, we can do a follow up and if everything looks good at that point, she can likely continue follow up with her PCP. Thanks!

## 2024-03-07 ENCOUNTER — HOSPITAL ENCOUNTER (OUTPATIENT)
Dept: ULTRASOUND IMAGING | Age: 69
Discharge: HOME | End: 2024-03-07
Attending: STUDENT IN AN ORGANIZED HEALTH CARE EDUCATION/TRAINING PROGRAM
Payer: MEDICARE

## 2024-03-07 ENCOUNTER — LAB ENCOUNTER (OUTPATIENT)
Dept: LAB | Age: 69
End: 2024-03-07
Attending: STUDENT IN AN ORGANIZED HEALTH CARE EDUCATION/TRAINING PROGRAM
Payer: MEDICARE

## 2024-03-07 DIAGNOSIS — R94.6 ABNORMAL THYROID FUNCTION TEST: ICD-10-CM

## 2024-03-07 DIAGNOSIS — E04.1 THYROID NODULE: ICD-10-CM

## 2024-03-07 DIAGNOSIS — D50.9 IRON DEFICIENCY ANEMIA, UNSPECIFIED IRON DEFICIENCY ANEMIA TYPE: ICD-10-CM

## 2024-03-07 DIAGNOSIS — R94.6: ICD-10-CM

## 2024-03-07 LAB
BASOPHILS # BLD AUTO: 0.17 X10(3) UL (ref 0–0.2)
BASOPHILS NFR BLD AUTO: 2.1 %
DEPRECATED HBV CORE AB SER IA-ACNC: 28.1 NG/ML
EOSINOPHIL # BLD AUTO: 0.37 X10(3) UL (ref 0–0.7)
EOSINOPHIL NFR BLD AUTO: 4.6 %
ERYTHROCYTE [DISTWIDTH] IN BLOOD BY AUTOMATED COUNT: 13 %
HCT VFR BLD AUTO: 44.2 %
HGB BLD-MCNC: 15 G/DL
IMM GRANULOCYTES # BLD AUTO: 0.06 X10(3) UL (ref 0–1)
IMM GRANULOCYTES NFR BLD: 0.7 %
IRON SATN MFR SERPL: 30 %
IRON SERPL-MCNC: 117 UG/DL
LYMPHOCYTES # BLD AUTO: 1.73 X10(3) UL (ref 1–4)
LYMPHOCYTES NFR BLD AUTO: 21.3 %
MCH RBC QN AUTO: 30.4 PG (ref 26–34)
MCHC RBC AUTO-ENTMCNC: 33.9 G/DL (ref 31–37)
MCV RBC AUTO: 89.5 FL
MONOCYTES # BLD AUTO: 0.48 X10(3) UL (ref 0.1–1)
MONOCYTES NFR BLD AUTO: 5.9 %
NEUTROPHILS # BLD AUTO: 5.32 X10 (3) UL (ref 1.5–7.7)
NEUTROPHILS # BLD AUTO: 5.32 X10(3) UL (ref 1.5–7.7)
NEUTROPHILS NFR BLD AUTO: 65.4 %
PLATELET # BLD AUTO: 318 10(3)UL (ref 150–450)
RBC # BLD AUTO: 4.94 X10(6)UL
T3 SERPL-MCNC: 103 NG/DL (ref 60–181)
TIBC SERPL-MCNC: 389 UG/DL (ref 240–450)
TRANSFERRIN SERPL-MCNC: 261 MG/DL (ref 200–360)
TSI SER-ACNC: 1.34 MIU/ML (ref 0.36–3.74)
WBC # BLD AUTO: 8.1 X10(3) UL (ref 4–11)

## 2024-03-07 PROCEDURE — 84439 ASSAY OF FREE THYROXINE: CPT

## 2024-03-07 PROCEDURE — 36415 COLL VENOUS BLD VENIPUNCTURE: CPT

## 2024-03-07 PROCEDURE — 83550 IRON BINDING TEST: CPT

## 2024-03-07 PROCEDURE — 84443 ASSAY THYROID STIM HORMONE: CPT

## 2024-03-07 PROCEDURE — 85025 COMPLETE CBC W/AUTO DIFF WBC: CPT

## 2024-03-07 PROCEDURE — 82728 ASSAY OF FERRITIN: CPT

## 2024-03-07 PROCEDURE — 84480 ASSAY TRIIODOTHYRONINE (T3): CPT

## 2024-03-07 PROCEDURE — 76536 US EXAM OF HEAD AND NECK: CPT | Performed by: STUDENT IN AN ORGANIZED HEALTH CARE EDUCATION/TRAINING PROGRAM

## 2024-03-07 PROCEDURE — 83540 ASSAY OF IRON: CPT

## 2024-03-11 ENCOUNTER — OFFICE VISIT (OUTPATIENT)
Dept: HEMATOLOGY/ONCOLOGY | Age: 69
End: 2024-03-11
Attending: INTERNAL MEDICINE
Payer: MEDICARE

## 2024-03-11 VITALS
RESPIRATION RATE: 16 BRPM | DIASTOLIC BLOOD PRESSURE: 80 MMHG | OXYGEN SATURATION: 96 % | TEMPERATURE: 98 F | SYSTOLIC BLOOD PRESSURE: 138 MMHG | HEART RATE: 80 BPM

## 2024-03-11 DIAGNOSIS — D50.8 IRON DEFICIENCY ANEMIA SECONDARY TO INADEQUATE DIETARY IRON INTAKE: Primary | ICD-10-CM

## 2024-03-11 PROCEDURE — 96375 TX/PRO/DX INJ NEW DRUG ADDON: CPT

## 2024-03-11 PROCEDURE — 96374 THER/PROPH/DIAG INJ IV PUSH: CPT

## 2024-03-11 RX ORDER — METHYLPREDNISOLONE SODIUM SUCCINATE 125 MG/2ML
125 INJECTION, POWDER, LYOPHILIZED, FOR SOLUTION INTRAMUSCULAR; INTRAVENOUS ONCE
Status: COMPLETED | OUTPATIENT
Start: 2024-03-11 | End: 2024-03-11

## 2024-03-11 RX ORDER — METHYLPREDNISOLONE SODIUM SUCCINATE 125 MG/2ML
125 INJECTION, POWDER, LYOPHILIZED, FOR SOLUTION INTRAMUSCULAR; INTRAVENOUS ONCE
Status: CANCELLED
Start: 2024-03-11 | End: 2024-03-11

## 2024-03-11 RX ADMIN — METHYLPREDNISOLONE SODIUM SUCCINATE 125 MG: 125 INJECTION, POWDER, LYOPHILIZED, FOR SOLUTION INTRAMUSCULAR; INTRAVENOUS at 13:40:00

## 2024-03-11 NOTE — PROGRESS NOTES
Education Record    Learner:  Patient    Disease / Diagnosis: pt here for feraheme     Barriers / Limitations:  None    Method:  Brief focused, printed material and  reinforcement    General Topics:  Plan of care reviewed    Outcome: pt tolerated infusion with no c/o.

## 2024-03-12 ENCOUNTER — MED REC SCAN ONLY (OUTPATIENT)
Facility: CLINIC | Age: 69
End: 2024-03-12

## 2024-03-13 LAB — T4 FREE DIALYSIS/MS: 1 NG/DL

## 2024-03-22 ENCOUNTER — PATIENT MESSAGE (OUTPATIENT)
Dept: NEUROLOGY | Facility: CLINIC | Age: 69
End: 2024-03-22

## 2024-03-22 ENCOUNTER — TELEPHONE (OUTPATIENT)
Dept: NEUROLOGY | Facility: CLINIC | Age: 69
End: 2024-03-22

## 2024-03-22 NOTE — TELEPHONE ENCOUNTER
Patient is calling requesting a prescription for SUMAtriptan Succinate. Patient stated she has a migraine that haven't gone away even after taking some tylenol.

## 2024-03-22 NOTE — TELEPHONE ENCOUNTER
From: Kim Hill  To: Melida Donald  Sent: 3/22/2024 3:04 PM CDT  Subject: migraine    I am out of Sumatriptan and have a bad migraine and I can't locate your phone number.    Can you please call me and call in an rx asap?    Thank you  Kim

## 2024-03-22 NOTE — TELEPHONE ENCOUNTER
Spoke to patient who states she is requesting to take sumatriptan and would need a refill.     Patient has not been prescribed sumatriptan since 2018 and she has a refill of fiorcet at the pharmacy. Patient states she has already taken tylenol today and it did not get rid of the headache and now it is a migraine. Patient was reluctant to take fioricet after taking 1300 mg of tylenol. Advised patient she is okay to take 1 dose of fiorcet to help with migraine.    Offered patient medrol dose pack but patient refused and stated she does not want to take steroids..    Patient will take the dose of fiorcet and see how she feels.     Advised if symptoms persist or become unbearable to be seen in UC or ED. Patient verbalized understanding but also declined.

## 2024-03-23 RX ORDER — SUMATRIPTAN 100 MG/1
TABLET, FILM COATED ORAL
Qty: 9 TABLET | Refills: 1 | Status: SHIPPED | OUTPATIENT
Start: 2024-03-23

## 2024-04-18 ENCOUNTER — PATIENT MESSAGE (OUTPATIENT)
Facility: CLINIC | Age: 69
End: 2024-04-18

## 2024-04-18 NOTE — TELEPHONE ENCOUNTER
From: Kim Hill  To: Gracie Gokul  Sent: 4/18/2024 7:22 AM CDT  Subject: Hypogammaglobulinemia and Mannose Binding Lectin deficiency.    *I am attaching copies of my labs from 4/1/24, ordered by my immunologist, Dr. FLORENCE Young, whom I saw on 4/17/24. 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  *I will begin receiving IVIG after insurance approval, hopefully around the end of April.  *I would like for you review these labs and let me know if any of this is of interest to you. I have now received two iron infusions and also I have been receiving B-12 injections, every 2 weeks from my GP for the last 6 months. The GP did a B-12 test which has shown improvement and I am now on B-12 injections once a month. Also please note that I have Stage III Kidney Disease.  *Can you please let me know your thoughts and keep a copy of this in my chart?    Thank you,Les

## 2024-04-21 NOTE — TELEPHONE ENCOUNTER
Hi - I appreciate her sharing her results with me. However, as I only manage endocrine-related issues, I do not have anything to add regarding these diagnoses as they are not related to the endocrine system and I do not have the expertise to interpret the lab results. I recommend she continue close follow up with her immunologist and a kidney specialist (if recommended by her PCP). Her PCP is managing her B12 and other vitamin deficiencies appropriately. We are no longer managing any active issues for her so she is not required to share data from other physicians with me, but I remain available for any endo-related concerns going forward. Thanks!

## 2024-06-18 DIAGNOSIS — D50.8 IRON DEFICIENCY ANEMIA SECONDARY TO INADEQUATE DIETARY IRON INTAKE: Primary | ICD-10-CM

## 2024-06-19 ENCOUNTER — LAB ENCOUNTER (OUTPATIENT)
Dept: LAB | Age: 69
End: 2024-06-19
Attending: FAMILY MEDICINE

## 2024-06-19 DIAGNOSIS — D50.8 IRON DEFICIENCY ANEMIA SECONDARY TO INADEQUATE DIETARY IRON INTAKE: ICD-10-CM

## 2024-06-19 LAB
BASOPHILS # BLD AUTO: 0.17 X10(3) UL (ref 0–0.2)
BASOPHILS NFR BLD AUTO: 2.3 %
DEPRECATED HBV CORE AB SER IA-ACNC: 157.1 NG/ML
EOSINOPHIL # BLD AUTO: 0.26 X10(3) UL (ref 0–0.7)
EOSINOPHIL NFR BLD AUTO: 3.4 %
ERYTHROCYTE [DISTWIDTH] IN BLOOD BY AUTOMATED COUNT: 12.8 %
HCT VFR BLD AUTO: 43.1 %
HGB BLD-MCNC: 14.6 G/DL
IMM GRANULOCYTES # BLD AUTO: 0.05 X10(3) UL (ref 0–1)
IMM GRANULOCYTES NFR BLD: 0.7 %
IRON SATN MFR SERPL: 23 %
IRON SERPL-MCNC: 82 UG/DL
LYMPHOCYTES # BLD AUTO: 1.98 X10(3) UL (ref 1–4)
LYMPHOCYTES NFR BLD AUTO: 26.2 %
MCH RBC QN AUTO: 30.6 PG (ref 26–34)
MCHC RBC AUTO-ENTMCNC: 33.9 G/DL (ref 31–37)
MCV RBC AUTO: 90.4 FL
MONOCYTES # BLD AUTO: 0.59 X10(3) UL (ref 0.1–1)
MONOCYTES NFR BLD AUTO: 7.8 %
NEUTROPHILS # BLD AUTO: 4.5 X10 (3) UL (ref 1.5–7.7)
NEUTROPHILS # BLD AUTO: 4.5 X10(3) UL (ref 1.5–7.7)
NEUTROPHILS NFR BLD AUTO: 59.6 %
PLATELET # BLD AUTO: 321 10(3)UL (ref 150–450)
RBC # BLD AUTO: 4.77 X10(6)UL
TIBC SERPL-MCNC: 359 UG/DL (ref 240–450)
TRANSFERRIN SERPL-MCNC: 241 MG/DL (ref 200–360)
WBC # BLD AUTO: 7.6 X10(3) UL (ref 4–11)

## 2024-06-19 PROCEDURE — 83540 ASSAY OF IRON: CPT

## 2024-06-19 PROCEDURE — 36415 COLL VENOUS BLD VENIPUNCTURE: CPT

## 2024-06-19 PROCEDURE — 83550 IRON BINDING TEST: CPT

## 2024-06-19 PROCEDURE — 85025 COMPLETE CBC W/AUTO DIFF WBC: CPT

## 2024-06-19 PROCEDURE — 82728 ASSAY OF FERRITIN: CPT

## (undated) DEVICE — 35 ML SYRINGE REGULAR TIP: Brand: MONOJECT

## (undated) DEVICE — ALL PURPOSE SPONGES,NONWOVEN, 4 PLY: Brand: CURITY

## (undated) NOTE — LETTER
[address], [place]  Authorization for Invasive Procedures  1.  I hereby authorize Dr. Amina Ambrosio , my physician and whomever may be designated as the doctor's assistant, to perform the following operation and/or procedure:  Eso fever and allergic reactions, hemolytic reactions, transmission of disease such as hepatitis, AIDS, cytomegalovirus (CMV), and flluid overload.  In the event that I wish to have autologous transfusions of my own blood, or a directed donor transfusion, I roseline Signature of Patient:  ________________________________________________ Date: _________Time: _________    Responsible person in case of minor or unconscious: _____________________________Relationship: ____________     Witness Signature: ___________________

## (undated) NOTE — LETTER
[facility]  Administration of Anesthesia  1. [address], or _________________________________ acting on her behalf, (Patient) (Dependent/Representative) request to receive anesthesia for my pending procedure/operation/treatment.   A bleeding, seizure, cardiac arrest and death. 7. AWARENESS: I understand that it is possible (but unlikely) to have explicit memory of events from the operating room while under general anesthesia.   8. ELECTROCONVULSIVE THERAPY PATIENTS: This consent serve below affirms that prior to the time of the procedure, I have explained to the patient and/or his/her guardian, the risks and benefits of undergoing anesthesia, as well as any reasonable alternatives.     ___________________________________________________